# Patient Record
Sex: FEMALE | Race: WHITE | Employment: FULL TIME | URBAN - METROPOLITAN AREA
[De-identification: names, ages, dates, MRNs, and addresses within clinical notes are randomized per-mention and may not be internally consistent; named-entity substitution may affect disease eponyms.]

---

## 2017-04-06 ENCOUNTER — OFFICE VISIT (OUTPATIENT)
Dept: INTERNAL MEDICINE CLINIC | Age: 32
End: 2017-04-06

## 2017-04-06 VITALS
TEMPERATURE: 97.8 F | DIASTOLIC BLOOD PRESSURE: 44 MMHG | HEART RATE: 84 BPM | WEIGHT: 159 LBS | BODY MASS INDEX: 23.55 KG/M2 | RESPIRATION RATE: 16 BRPM | OXYGEN SATURATION: 98 % | HEIGHT: 69 IN | SYSTOLIC BLOOD PRESSURE: 95 MMHG

## 2017-04-06 DIAGNOSIS — Z00.00 WELL ADULT ON ROUTINE HEALTH CHECK: Primary | ICD-10-CM

## 2017-04-06 NOTE — PATIENT INSTRUCTIONS

## 2017-04-06 NOTE — LETTER
4/7/2017 11:09 AM 
 
Ms. [facility] Per 
[address] [address] [facility] Per: 
 
Please find your most recent results below. Resulted Orders CBC WITH AUTOMATED DIFF Result Value Ref Range WBC 6.3 3.4 - 10.8 x10E3/uL  
 RBC 4.59 3.77 - 5.28 x10E6/uL HGB 13.6 11.1 - 15.9 g/dL HCT 41.9 34.0 - 46.6 % MCV 91 79 - 97 fL  
 MCH 29.6 26.6 - 33.0 pg  
 MCHC 32.5 31.5 - 35.7 g/dL  
 RDW 13.8 12.3 - 15.4 % PLATELET 782 176 - 639 x10E3/uL NEUTROPHILS 61 % Lymphocytes 32 % MONOCYTES 6 % EOSINOPHILS 1 % BASOPHILS 0 %  
 ABS. NEUTROPHILS 3.8 1.4 - 7.0 x10E3/uL Abs Lymphocytes 2.0 0.7 - 3.1 x10E3/uL  
 ABS. MONOCYTES 0.4 0.1 - 0.9 x10E3/uL  
 ABS. EOSINOPHILS 0.0 0.0 - 0.4 x10E3/uL  
 ABS. BASOPHILS 0.0 0.0 - 0.2 x10E3/uL IMMATURE GRANULOCYTES 0 %  
 ABS. IMM. GRANS. 0.0 0.0 - 0.1 x10E3/uL Narrative Performed at:  [address]  [id number] : Angel Diallo MD, Phone:  [phone] UA/M W/RFLX CULTURE, COMP Result Value Ref Range Specific Gravity 1.023 1.005 - 1.030  
 pH (UA) 6.0 5.0 - 7.5 Color Yellow Yellow Appearance Cloudy (A) Clear Leukocyte Esterase Negative Negative Protein Negative Negative/Trace Glucose Negative Negative Ketone Negative Negative Blood Negative Negative Bilirubin Negative Negative Urobilinogen 0.2 0.2 - 1.0 mg/dL Nitrites Negative Negative Microscopic Examination Comment Comment:  
   Microscopic follows if indicated. Microscopic exam See additional order Comment:  
   Microscopic was indicated and was performed. URINALYSIS REFLEX Comment Comment: This specimen will not reflex to a Urine Culture. Narrative Performed at:  [address]  [id number] : Angel Diallo MD, Phone:  [phone] LIPID PANEL Result Value Ref Range Cholesterol, total 215 (H) 100 - 199 mg/dL Triglyceride 83 0 - 149 mg/dL HDL Cholesterol 94 >39 mg/dL VLDL, calculated 17 5 - 40 mg/dL LDL, calculated 104 (H) 0 - 99 mg/dL Narrative Performed at:  [address]  [id number] : Desiree Bellamy MD, Phone:  [phone] METABOLIC PANEL, COMPREHENSIVE Result Value Ref Range Glucose 71 65 - 99 mg/dL BUN 15 6 - 20 mg/dL Creatinine 0.79 0.57 - 1.00 mg/dL GFR est non- >59 mL/min/1.73 GFR est  >59 mL/min/1.73  
 BUN/Creatinine ratio 19 9 - 23 Comment: **Please note reference interval change** Sodium 142 134 - 144 mmol/L Potassium 4.5 3.5 - 5.2 mmol/L Chloride 101 96 - 106 mmol/L  
 CO2 22 18 - 29 mmol/L Calcium 9.9 8.7 - 10.2 mg/dL Protein, total 7.6 6.0 - 8.5 g/dL Albumin 5.2 3.5 - 5.5 g/dL GLOBULIN, TOTAL 2.4 1.5 - 4.5 g/dL A-G Ratio 2.2 1.2 - 2.2 Comment: **Please note reference interval change** Bilirubin, total 0.2 0.0 - 1.2 mg/dL Alk. phosphatase 48 39 - 117 IU/L  
 AST (SGOT) 12 0 - 40 IU/L  
 ALT (SGPT) 13 0 - 32 IU/L Narrative Performed at:  [address]  [id number] : Desiree Bellamy MD, Phone:  [phone] TSH AND FREE T4 Result Value Ref Range TSH 2.280 0.450 - 4.500 uIU/mL T4, Free 1.06 0.82 - 1.77 ng/dL Narrative Performed at:  [address]  [id number] : Desiree Bellamy MD, Phone:  [phone] MICROSCOPIC EXAMINATION Result Value Ref Range WBC 0-5 0 - 5 /hpf  
 RBC 0-2 0 - 2 /hpf Epithelial cells 0-10 0 - 10 /hpf Casts None seen None seen /lpf Mucus Present Not Estab. Bacteria None seen None seen/Few Narrative Performed at:  [address]  [id number] : Marycarmen Perez MD, Phone:  [phone] CVD REPORT Result Value Ref Range INTERPRETATION Note Comment:  
   Supplement report is available. Narrative Performed at:  [address] [place] [address]  [id number] : Deidra Camargo PhD, Phone:  [phone] RECOMMENDATIONS: 
 
1. CBC, kidney, liver, thyroid, UA level is within normal range. 2. Cholesterol level slightly above normal range.  No need to start any medication. Continue watching your diet, eat healthy, less traylor and fatty food, more baked or grilled or broiled food. Exercise 150 minutes/week will be helpful as well. Will recheck level in one year. Please call me if you have any questions: [phone] Sincerely, 
 
 
Adelita Hanson MD

## 2017-04-06 NOTE — MR AVS SNAPSHOT
Visit Information Date & Time Provider Department Dept. Phone Encounter #  
 4/6/2017  8:30 AM Marck Massey MD [facility] [phone] Follow-up Instructions Return in about 1 week (around 4/13/2017) for PPD. Upcoming Health Maintenance Date Due  
 PAP AKA CERVICAL CYTOLOGY 3/10/2020 DTaP/Tdap/Td series (2 - Td) 4/6/2027 Allergies as of 4/6/2017  Review Complete On: 4/6/2017 By: Patti Salmon MD  
 No Known Allergies Current Immunizations  Never Reviewed No immunizations on file. Not reviewed this visit You Were Diagnosed With   
  
 Codes Comments Well adult on routine health check    -  Primary ICD-10-CM: Z00.00 ICD-9-CM: V70.0 Vitals BP Pulse Temp Resp Height(growth percentile) Weight(growth percentile) 95/44 (BP 1 Location: Left arm, BP Patient Position: Sitting) 84 97.8 °F (36.6 °C) (Oral) 16 5' 9\" (1.753 m) 159 lb (72.1 kg) LMP SpO2 BMI OB Status Smoking Status 03/18/2017 98% 23.48 kg/m2 Having regular periods Never Smoker Vitals History BMI and BSA Data Body Mass Index Body Surface Area  
 23.48 kg/m 2 1.87 m 2 Preferred Pharmacy Pharmacy Name Phone [id number] [phone] Your Updated Medication List  
  
Notice  As of 4/6/2017  8:44 AM  
 You have not been prescribed any medications. We Performed the Following CBC WITH AUTOMATED DIFF [91660 CPT(R)] LIPID PANEL [84226 CPT(R)] METABOLIC PANEL, COMPREHENSIVE [60453 CPT(R)] TSH AND FREE T4 [85823 CPT(R)] UA/M W/RFLX CULTURE, COMP [35488 CPT(R)] Follow-up Instructions Return in about 1 week (around 4/13/2017) for PPD. Patient Instructions Well Visit, Ages 25 to 48: Care Instructions Your Care Instructions Physical exams can help you stay healthy.  Your doctor has checked your overall health and may have suggested ways to take good care of yourself. He or she also may have recommended tests. At home, you can help prevent illness with healthy eating, regular exercise, and other steps. Follow-up care is a key part of your treatment and safety. Be sure to make and go to all appointments, and call your doctor if you are having problems. It's also a good idea to know your test results and keep a list of the medicines you take. How can you care for yourself at home? · Reach and stay at a healthy weight. This will lower your risk for many problems, such as obesity, diabetes, heart disease, and high blood pressure. · Get at least 30 minutes of physical activity on most days of the week. Walking is a good choice. You also may want to do other activities, such as running, swimming, cycling, or playing tennis or team sports. Discuss any changes in your exercise program with your doctor. · Do not smoke or allow others to smoke around you. If you need help quitting, talk to your doctor about stop-smoking programs and medicines. These can increase your chances of quitting for good. · Talk to your doctor about whether you have any risk factors for sexually transmitted infections (STIs). Having one sex partner (who does not have STIs and does not have sex with anyone else) is a good way to avoid these infections. · Use birth control if you do not want to have children at this time. Talk with your doctor about the choices available and what might be best for you. · Protect your skin from too much sun. When you're outdoors from 10 a.m. to 4 p.m., stay in the shade or cover up with clothing and a hat with a wide brim. Wear sunglasses that block UV rays. Even when it's cloudy, put broad-spectrum sunscreen (SPF 30 or higher) on any exposed skin. · See a dentist one or two times a year for checkups and to have your teeth cleaned. · Wear a seat belt in the car. · Drink alcohol in moderation, if at all. That means no more than 2 drinks a day for men and 1 drink a day for women. Follow your doctor's advice about when to have certain tests. These tests can spot problems early. For everyone · Cholesterol. Have the fat (cholesterol) in your blood tested after age 21. Your doctor will tell you how often to have this done based on your age, family history, or other things that can increase your risk for heart disease. · Blood pressure. Have your blood pressure checked during a routine doctor visit. Your doctor will tell you how often to check your blood pressure based on your age, your blood pressure results, and other factors. · Vision. Talk with your doctor about how often to have a glaucoma test. 
· Diabetes. Ask your doctor whether you should have tests for diabetes. · Colon cancer. Have a test for colon cancer at age 48. You may have one of several tests. If you are younger than 48, you may need a test earlier if you have any risk factors. Risk factors include whether you already had a precancerous polyp removed from your colon or whether your parent, brother, sister, or child has had colon cancer. For women · Breast exam and mammogram. Talk to your doctor about when you should have a clinical breast exam and a mammogram. Medical experts differ on whether and how often women under 50 should have these tests. Your doctor can help you decide what is right for you. · Pap test and pelvic exam. Begin Pap tests at age 24. A Pap test is the best way to find cervical cancer. The test often is part of a pelvic exam. Ask how often to have this test. 
· Tests for sexually transmitted infections (STIs). Ask whether you should have tests for STIs. You may be at risk if you have sex with more than one person, especially if your partners do not wear condoms. For men · Tests for sexually transmitted infections (STIs).  Ask whether you should have tests for STIs. You may be at risk if you have sex with more than one person, especially if you do not wear a condom. · Testicular cancer exam. Ask your doctor whether you should check your testicles regularly. · Prostate exam. Talk to your doctor about whether you should have a blood test (called a PSA test) for prostate cancer. Experts differ on whether and when men should have this test. Some experts suggest it if you are older than 39 and are -American or have a father or brother who got prostate cancer when he was younger than 72. When should you call for help? Watch closely for changes in your health, and be sure to contact your doctor if you have any problems or symptoms that concern you. Where can you learn more? Go to http://ann-kaylah.info/. Enter P072 in the search box to learn more about \"Well Visit, Ages 25 to 48: Care Instructions. \" Current as of: July 19, 2016 Content Version: 11.2 © 6153-0048 [employer]. Care instructions adapted under license by [employer] (which disclaims liability or warranty for this information). If you have questions about a medical condition or this instruction, always ask your healthcare professional. [address] [address] any warranty or liability for your use of this information. Introducing [facility]! [employer] introduces [employer]azari patient portal. Now you can access parts of your medical record, email your doctor's office, and request medication refills online. 1. In your internet browser, go to [email or web address] 2. Click on the First Time User? Click Here link in the Sign In box. You will see the New Member Sign Up page. 3. Enter your Bazari Access Code exactly as it appears below. You will not need to use this code after youve completed the sign-up process. If you do not sign up before the expiration date, you must request a new code. · [employer] Access Code: [facility] Expires: 7/5/2017  8:44 AM 
 
4. Enter the last four digits of your Social Security Number (xxxx) and Date of Birth (mm/dd/yyyy) as indicated and click Submit. You will be taken to the next sign-up page. 5. Create a [employer] ID. This will be your [employer] login ID and cannot be changed, so think of one that is secure and easy to remember. 6. Create a [employer] password. You can change your password at any time. 7. Enter your Password Reset Question and Answer. This can be used at a later time if you forget your password. 8. Enter your e-mail address. You will receive e-mail notification when new information is available in [address] 9. Click Sign Up. You can now view and download portions of your medical record. 10. Click the Download Summary menu link to download a portable copy of your medical information. If you have questions, please visit the Frequently Asked Questions section of the [employer] website. Remember, [employer] is NOT to be used for urgent needs. For medical emergencies, dial 911. Now available from your iPhone and Android! Please provide this summary of care documentation to your next provider. If you have any questions after today's visit, please call [phone].

## 2017-04-06 NOTE — PROGRESS NOTES
Subjective:   28 y.o. female is here as a new patient for Well Woman Check as well as a physical form fill up for her [facility] care application. She reports fairly healthy. Diagnosed with infertility few years ago. She says she is still struggling with that. Goes to therapy on a regular basis. Not on any medication. Her gyne and breast care is done elsewhere by her Ob-Gyne physician at [facility]. Last pap was done in March 2017. There is no problem list on file for this patient. No Known Allergies  History reviewed. No pertinent past medical history. Past Surgical History:   Procedure Laterality Date    HX SEPTOPLASTY      HX WISDOM TEETH EXTRACTION       Family History   Problem Relation Age of Onset    Bipolar Disorder Father     Diabetes Brother     No Known Problems Brother      Social History   Substance Use Topics    Smoking status: Never Smoker    Smokeless tobacco: Not on file    Alcohol use 1.2 oz/week     1 Glasses of wine, 1 Cans of beer per week      Comment: rarely             ROS: Feeling generally well. No TIA's or unusual headaches, no dysphagia. No prolonged cough. No dyspnea or chest pain on exertion. No abdominal pain, change in bowel habits, black or bloody stools. No urinary tract symptoms. No new or unusual musculoskeletal symptoms. Specific concerns today: need form fill up as well as PPD. Objective: The patient appears well, alert, oriented x 3, in no distress. Visit Vitals    BP 95/44 (BP 1 Location: Left arm, BP Patient Position: Sitting)    Pulse 84    Temp 97.8 °F (36.6 °C) (Oral)    Resp 16    Ht 5' 9\" (1.753 m)    Wt 159 lb (72.1 kg)    LMP 03/18/2017    SpO2 98%    BMI 23.48 kg/m2     ENT normal.  Neck supple. No adenopathy or thyromegaly. MYRNA. Lungs are clear, good air entry, no wheezes, rhonchi or rales. S1 and S2 normal, no murmurs, regular rate and rhythm. Abdomen soft without tenderness, guarding, mass or organomegaly.  Extremities show no edema, normal peripheral pulses. Neurological is normal, no focal findings. Breast and Pelvic exams are deferred. Assessment/Plan:   Well Woman  increase physical activity, follow low fat diet, continue present plan, routine labs ordered, call if any problems    ICD-10-CM ICD-9-CM    1. Well adult on routine health check Z00.00 V70.0 CBC WITH AUTOMATED DIFF      UA/M W/RFLX CULTURE, COMP      LIPID PANEL      METABOLIC PANEL, COMPREHENSIVE      TSH AND FREE T4     Eveline Astorga was seen today for establish care. Diagnoses and all orders for this visit:    Well adult on routine health check  -     CBC WITH AUTOMATED DIFF  -     UA/M W/RFLX CULTURE, COMP  -     LIPID PANEL  -     METABOLIC PANEL, COMPREHENSIVE  -     TSH AND FREE T4       As soon as I get the blood work result and PPD result we will call patient to get the the form  from office. Follow-up Disposition:  Return in about 1 week (around 4/13/2017) for PPD.   reviewed diet, exercise and weight control

## 2017-04-07 LAB
ALBUMIN SERPL-MCNC: 5.2 G/DL (ref 3.5–5.5)
ALBUMIN/GLOB SERPL: 2.2 {RATIO} (ref 1.2–2.2)
ALP SERPL-CCNC: 48 IU/L (ref 39–117)
ALT SERPL-CCNC: 13 IU/L (ref 0–32)
APPEARANCE UR: ABNORMAL
AST SERPL-CCNC: 12 IU/L (ref 0–40)
BACTERIA #/AREA URNS HPF: NORMAL /[HPF]
BASOPHILS # BLD AUTO: 0 X10E3/UL (ref 0–0.2)
BASOPHILS NFR BLD AUTO: 0 %
BILIRUB SERPL-MCNC: 0.2 MG/DL (ref 0–1.2)
BILIRUB UR QL STRIP: NEGATIVE
BUN SERPL-MCNC: 15 MG/DL (ref 6–20)
BUN/CREAT SERPL: 19 (ref 9–23)
CALCIUM SERPL-MCNC: 9.9 MG/DL (ref 8.7–10.2)
CASTS URNS QL MICRO: NORMAL /LPF
CHLORIDE SERPL-SCNC: 101 MMOL/L (ref 96–106)
CHOLEST SERPL-MCNC: 215 MG/DL (ref 100–199)
CO2 SERPL-SCNC: 22 MMOL/L (ref 18–29)
COLOR UR: YELLOW
CREAT SERPL-MCNC: 0.79 MG/DL (ref 0.57–1)
EOSINOPHIL # BLD AUTO: 0 X10E3/UL (ref 0–0.4)
EOSINOPHIL NFR BLD AUTO: 1 %
EPI CELLS #/AREA URNS HPF: NORMAL /HPF
ERYTHROCYTE [DISTWIDTH] IN BLOOD BY AUTOMATED COUNT: 13.8 % (ref 12.3–15.4)
GLOBULIN SER CALC-MCNC: 2.4 G/DL (ref 1.5–4.5)
GLUCOSE SERPL-MCNC: 71 MG/DL (ref 65–99)
GLUCOSE UR QL: NEGATIVE
HCT VFR BLD AUTO: 41.9 % (ref 34–46.6)
HDLC SERPL-MCNC: 94 MG/DL
HGB BLD-MCNC: 13.6 G/DL (ref 11.1–15.9)
HGB UR QL STRIP: NEGATIVE
IMM GRANULOCYTES # BLD: 0 X10E3/UL (ref 0–0.1)
IMM GRANULOCYTES NFR BLD: 0 %
INTERPRETATION, 910389: NORMAL
KETONES UR QL STRIP: NEGATIVE
LDLC SERPL CALC-MCNC: 104 MG/DL (ref 0–99)
LEUKOCYTE ESTERASE UR QL STRIP: NEGATIVE
LYMPHOCYTES # BLD AUTO: 2 X10E3/UL (ref 0.7–3.1)
LYMPHOCYTES NFR BLD AUTO: 32 %
MCH RBC QN AUTO: 29.6 PG (ref 26.6–33)
MCHC RBC AUTO-ENTMCNC: 32.5 G/DL (ref 31.5–35.7)
MCV RBC AUTO: 91 FL (ref 79–97)
MICRO URNS: ABNORMAL
MICRO URNS: ABNORMAL
MONOCYTES # BLD AUTO: 0.4 X10E3/UL (ref 0.1–0.9)
MONOCYTES NFR BLD AUTO: 6 %
MUCOUS THREADS URNS QL MICRO: PRESENT
NEUTROPHILS # BLD AUTO: 3.8 X10E3/UL (ref 1.4–7)
NEUTROPHILS NFR BLD AUTO: 61 %
NITRITE UR QL STRIP: NEGATIVE
PH UR STRIP: 6 [PH] (ref 5–7.5)
PLATELET # BLD AUTO: 296 X10E3/UL (ref 150–379)
POTASSIUM SERPL-SCNC: 4.5 MMOL/L (ref 3.5–5.2)
PROT SERPL-MCNC: 7.6 G/DL (ref 6–8.5)
PROT UR QL STRIP: NEGATIVE
RBC # BLD AUTO: 4.59 X10E6/UL (ref 3.77–5.28)
RBC #/AREA URNS HPF: NORMAL /HPF
SODIUM SERPL-SCNC: 142 MMOL/L (ref 134–144)
SP GR UR: 1.02 (ref 1–1.03)
T4 FREE SERPL-MCNC: 1.06 NG/DL (ref 0.82–1.77)
TRIGL SERPL-MCNC: 83 MG/DL (ref 0–149)
TSH SERPL DL<=0.005 MIU/L-ACNC: 2.28 UIU/ML (ref 0.45–4.5)
URINALYSIS REFLEX , 377201: ABNORMAL
UROBILINOGEN UR STRIP-MCNC: 0.2 MG/DL (ref 0.2–1)
VLDLC SERPL CALC-MCNC: 17 MG/DL (ref 5–40)
WBC # BLD AUTO: 6.3 X10E3/UL (ref 3.4–10.8)
WBC #/AREA URNS HPF: NORMAL /HPF

## 2017-04-07 NOTE — PROGRESS NOTES
Please mail letter:     1. CBC, kidney, liver, thyroid, UA level is within normal range. 2. Cholesterol level slightly above normal range. No need to start any medication. Continue watching your diet, eat healthy, less traylor and fatty food, more baked or grilled or broiled food. Exercise 150 minutes/week will be helpful as well. Will recheck level in one year.

## 2017-04-10 ENCOUNTER — LAB ONLY (OUTPATIENT)
Dept: INTERNAL MEDICINE CLINIC | Age: 32
End: 2017-04-10

## 2017-04-10 DIAGNOSIS — Z11.1 TUBERCULOSIS SCREENING: Primary | ICD-10-CM

## 2017-04-10 NOTE — PROGRESS NOTES
PPD Placement note  Cailin Kempoche, 28 y.o. female is here today for placement of PPD test  PPD placed on 4/10/2017 on left forearm. Patient advised to return for reading within 48-72 hours.

## 2017-04-13 LAB
MM INDURATION POC: 0 MM (ref 0–5)
PPD POC: NEGATIVE NEGATIVE

## 2017-09-25 ENCOUNTER — TELEPHONE (OUTPATIENT)
Dept: INTERNAL MEDICINE CLINIC | Age: 32
End: 2017-09-25

## 2017-09-25 NOTE — TELEPHONE ENCOUNTER
Pt states that the medication will be for anxiety/depression. She has not taken anything in the past. Advised that Kit Hopkins will follow with psychologist to prescribe prescription. If psychiatrist is needed in the future she will be referred to one. Pt verbalized her understanding.

## 2017-09-25 NOTE — TELEPHONE ENCOUNTER
Is she asking for Rx for anxiety/depression? What exactly she is asking for? Is she on any medication?

## 2017-09-25 NOTE — TELEPHONE ENCOUNTER
Patient is seeing a psychologist with [facility] with [facility] and requesting that Dr. Sriram Brambila write prescriptions for her in line with her seeing this psychologist.  Patient was asking about that and needed to know in order to see if she could move forward or needed to find another pcp.

## 2017-10-12 ENCOUNTER — OFFICE VISIT (OUTPATIENT)
Dept: INTERNAL MEDICINE CLINIC | Age: 32
End: 2017-10-12

## 2017-10-12 VITALS
HEART RATE: 84 BPM | TEMPERATURE: 97.3 F | BODY MASS INDEX: 23.55 KG/M2 | SYSTOLIC BLOOD PRESSURE: 90 MMHG | DIASTOLIC BLOOD PRESSURE: 65 MMHG | WEIGHT: 159 LBS | HEIGHT: 69 IN | RESPIRATION RATE: 17 BRPM | OXYGEN SATURATION: 100 %

## 2017-10-12 DIAGNOSIS — F41.8 ANXIETY ASSOCIATED WITH DEPRESSION: Primary | ICD-10-CM

## 2017-10-12 RX ORDER — VENLAFAXINE HYDROCHLORIDE 37.5 MG/1
37.5 CAPSULE, EXTENDED RELEASE ORAL DAILY
Qty: 30 CAP | Refills: 1 | Status: SHIPPED | OUTPATIENT
Start: 2017-10-12 | End: 2017-11-27 | Stop reason: SDUPTHER

## 2017-10-12 NOTE — PROGRESS NOTES
Subjective:     Chief Complaint   Patient presents with    Request For New Medication    Depression        She  is a 33y.o. year old female who presents today to discuss treatment option for her anxiety and depression. Reports that she has been seeing a clinical psychologist at [facility] psychologists for her ongoing depression and anxiety. She reports that she was diagnosed with infertility recently which has been a gig factor for her depression and anxiety. She has a 2 y/o daughter, she is always worry that something bad will happen. Reports that she never get enough sleep. Difficult to go back to sleep or staying asleep. PHQ score 12  KORTNEY score is 10   Pertinent items are noted in HPI.   Objective:     Vitals:    10/12/17 0747   BP: 90/65   Pulse: 84   Resp: 17   Temp: 97.3 °F (36.3 °C)   TempSrc: Oral   SpO2: 100%   Weight: 159 lb (72.1 kg)   Height: 5' 9\" (1.753 m)       Physical Examination: General appearance - alert, well appearing, and in no distress, oriented to person, place, and time and normal appearing weight  Mental status - alert, oriented to person, place, and time, normal mood, behavior, speech, dress, motor activity, and thought processes  Chest - clear to auscultation, no wheezes, rales or rhonchi, symmetric air entry  Heart - normal rate, regular rhythm, normal S1, S2, no murmurs, rubs, clicks or gallops    No Known Allergies   Social History     Social History    Marital status:      Spouse name: N/A    Number of children: N/A    Years of education: N/A     Social History Main Topics    Smoking status: Never Smoker    Smokeless tobacco: Never Used    Alcohol use 1.2 oz/week     1 Glasses of wine, 1 Cans of beer per week      Comment: rarely    Drug use: No    Sexual activity: Yes     Partners: Male     Birth control/ protection: None     Other Topics Concern    None     Social History Narrative      Family History   Problem Relation Age of Onset    Bipolar Disorder Father     Diabetes Brother     No Known Problems Brother       Past Surgical History:   Procedure Laterality Date    HX SEPTOPLASTY      HX WISDOM TEETH EXTRACTION        History reviewed. No pertinent past medical history. Assessment/ Plan:   Diagnoses and all orders for this visit:    1. Anxiety associated with depression  -     After discussing options we agreed on starting venlafaxine-SR (EFFEXOR-XR) 37.5 mg capsule; Take 1 Cap by mouth daily. Indications: ANXIETY WITH DEPRESSION  -     Continue therapy. -     Discussed med side effect.   -     Follow up in 6 weeks. Medication risks/benefits/costs/interactions/alternatives discussed with patient. Advised patient to call back or return to office if symptoms worsen/change/persist. If patient cannot reach us or should anything more severe/urgent arise he/she should proceed directly to the nearest emergency department. Discussed expected course/resolution/complications of diagnosis in detail with patient. Patient given a written after visit summary which includes her diagnoses, current medications and vitals. Patient expressed understanding with the diagnosis and plan.        Follow-up Disposition: Not on File

## 2017-10-12 NOTE — MR AVS SNAPSHOT
Visit Information Date & Time Provider Department Dept. Phone Encounter #  
 10/12/2017  7:30 AM Marck Moreno MD [facility] Internal Medicine [phone] [id number] Follow-up Instructions Return in about 6 weeks (around 11/23/2017) for anxiety and depression. Katherene Means Upcoming Health Maintenance Date Due  
 PAP AKA CERVICAL CYTOLOGY 3/10/2020 DTaP/Tdap/Td series (2 - Td) 4/6/2027 Allergies as of 10/12/2017  Review Complete On: 10/12/2017 By: Marilu Roberts MD  
 No Known Allergies Current Immunizations  Never Reviewed Name Date  
 TB Skin Test (PPD) Intradermal 4/10/2017 Not reviewed this visit You Were Diagnosed With   
  
 Codes Comments Anxiety associated with depression    -  Primary ICD-10-CM: F41.8 ICD-9-CM: 300.4 Vitals BP Pulse Temp Resp Height(growth percentile) Weight(growth percentile) 90/65 (BP 1 Location: Left arm, BP Patient Position: Sitting) 84 97.3 °F (36.3 °C) (Oral) 17 5' 9\" (1.753 m) 159 lb (72.1 kg) LMP SpO2 BMI OB Status Smoking Status 09/12/2017 100% 23.48 kg/m2 Having regular periods Never Smoker Vitals History BMI and BSA Data Body Mass Index Body Surface Area  
 23.48 kg/m 2 1.87 m 2 Preferred Pharmacy Pharmacy Name Phone [id number] [place] [place] [phone] Your Updated Medication List  
  
   
This list is accurate as of: 10/12/17  8:05 AM.  Always use your most recent med list.  
  
  
  
  
 venlafaxine-SR 37.5 mg capsule Commonly known as:  EFFEXOR-XR Take 1 Cap by mouth daily. Indications: ANXIETY WITH DEPRESSION Prescriptions Sent to Pharmacy Refills  
 venlafaxine-SR (EFFEXOR-XR) 37.5 mg capsule 1 Sig: Take 1 Cap by mouth daily. Indications: ANXIETY WITH DEPRESSION  Class: Normal  
 Pharmacy: [facility], [address] AT [address] [place] [address] #: [phone] Route: Oral  
  
Follow-up Instructions Return in about 6 weeks (around 11/23/2017) for anxiety and depression. .  
  
  
Patient Instructions Anxiety Disorder: Care Instructions Your Care Instructions Anxiety is a normal reaction to stress. Difficult situations can cause you to have symptoms such as sweaty palms and a nervous feeling. In an anxiety disorder, the symptoms are far more severe. Constant worry, muscle tension, trouble sleeping, nausea and diarrhea, and other symptoms can make normal daily activities difficult or impossible. These symptoms may occur for no reason, and they can affect your work, school, or social life. Medicines, counseling, and self-care can all help. Follow-up care is a key part of your treatment and safety. Be sure to make and go to all appointments, and call your doctor if you are having problems. It's also a good idea to know your test results and keep a list of the medicines you take. How can you care for yourself at home? · Take medicines exactly as directed. Call your doctor if you think you are having a problem with your medicine. · Go to your counseling sessions and follow-up appointments. · Recognize and accept your anxiety. Then, when you are in a situation that makes you anxious, say to yourself, \"This is not an emergency. I feel uncomfortable, but I am not in danger. I can keep going even if I feel anxious. \" · Be kind to your body: ¨ Relieve tension with exercise or a massage. ¨ Get enough rest. 
¨ Avoid alcohol, caffeine, nicotine, and illegal drugs. They can increase your anxiety level and cause sleep problems. ¨ Learn and do relaxation techniques. See below for more about these techniques. · Engage your mind. Get out and do something you enjoy. Go to a funny movie, or take a walk or hike. Plan your day. Having too much or too little to do can make you anxious. · Keep a record of your symptoms. Discuss your fears with a good friend or family member, or join a support group for people with similar problems. Talking to others sometimes relieves stress. · Get involved in social groups, or volunteer to help others. Being alone sometimes makes things seem worse than they are. · Get at least 30 minutes of exercise on most days of the week to relieve stress. Walking is a good choice. You also may want to do other activities, such as running, swimming, cycling, or playing tennis or team sports. Relaxation techniques Do relaxation exercises 10 to 20 minutes a day. You can play soothing, relaxing music while you do them, if you wish. · Tell others in your house that you are going to do your relaxation exercises. Ask them not to disturb you. · Find a comfortable place, away from all distractions and noise. · Lie down on your back, or sit with your back straight. · Focus on your breathing. Make it slow and steady. · Breathe in through your nose. Breathe out through either your nose or mouth. · Breathe deeply, filling up the area between your navel and your rib cage. Breathe so that your belly goes up and down. · Do not hold your breath. · Breathe like this for 5 to 10 minutes. Notice the feeling of calmness throughout your whole body. As you continue to breathe slowly and deeply, relax by doing the following for another 5 to 10 minutes: · Tighten and relax each muscle group in your body. You can begin at your toes and work your way up to your head. · Imagine your muscle groups relaxing and becoming heavy. · Empty your mind of all thoughts. · Let yourself relax more and more deeply. · Become aware of the state of calmness that surrounds you. · When your relaxation time is over, you can bring yourself back to alertness by moving your fingers and toes and then your hands and feet and then stretching and moving your entire body.  Sometimes people fall asleep during relaxation, but they usually wake up shortly afterward. · Always give yourself time to return to full alertness before you drive a car or do anything that might cause an accident if you are not fully alert. Never play a relaxation tape while you drive a car. When should you call for help? Call 911 anytime you think you may need emergency care. For example, call if: 
· You feel you cannot stop from hurting yourself or someone else. Keep the numbers for these national suicide hotlines: 2-573-962-TALK (5-357.375.5289) and 9-460-WFUPVEY (2-778.626.3780). If you or someone you know talks about suicide or feeling hopeless, get help right away. Watch closely for changes in your health, and be sure to contact your doctor if: 
· You have anxiety or fear that affects your life. · You have symptoms of anxiety that are new or different from those you had before. Where can you learn more? Go to http://ann-kaylah.info/. Enter P754 in the search box to learn more about \"Anxiety Disorder: Care Instructions. \" Current as of: July 26, 2016 Content Version: 11.3 © 9424-1342 [employer]. Care instructions adapted under license by End[employer] (which disclaims liability or warranty for this information). If you have questions about a medical condition or this instruction, always ask your healthcare professional. [place] [address] any warranty or liability for your use of this information. Introducing [facility]! Holly Peng introduces Informative patient portal. Now you can access parts of your medical record, email your doctor's office, and request medication refills online. 1. In your internet browser, go to [email or web address] [email or web address]/[email or web address] 2. Click on the First Time User? Click Here link in the Sign In box. You will see the New Member Sign Up page. 3. Enter your Informative Access Code exactly as it appears below.  You will not need to use this code after youve completed the sign-up process. If you do not sign up before the expiration date, you must request a new code. · [employer] Access Code: [email or web address] Expires: 1/10/2018  7:47 AM 
 
4. Enter the last four digits of your Social Security Number (xxxx) and Date of Birth (mm/dd/yyyy) as indicated and click Submit. You will be taken to the next sign-up page. 5. Create a [employer] ID. This will be your [employer] login ID and cannot be changed, so think of one that is secure and easy to remember. 6. Create a [employer] password. You can change your password at any time. 7. Enter your Password Reset Question and Answer. This can be used at a later time if you forget your password. 8. Enter your e-mail address. You will receive e-mail notification when new information is available in [address]. 9. Click Sign Up. You can now view and download portions of your medical record. 10. Click the Download Summary menu link to download a portable copy of your medical information. If you have questions, please visit the Frequently Asked Questions section of the [employer] website. Remember, [employer] is NOT to be used for urgent needs. For medical emergencies, dial 911. Now available from your iPhone and Android! Please provide this summary of care documentation to your next provider. If you have any questions after today's visit, please call [phone].

## 2017-10-12 NOTE — PATIENT INSTRUCTIONS

## 2017-11-27 ENCOUNTER — OFFICE VISIT (OUTPATIENT)
Dept: INTERNAL MEDICINE CLINIC | Age: 32
End: 2017-11-27

## 2017-11-27 VITALS
TEMPERATURE: 97.9 F | BODY MASS INDEX: 23.7 KG/M2 | HEIGHT: 69 IN | DIASTOLIC BLOOD PRESSURE: 56 MMHG | HEART RATE: 88 BPM | RESPIRATION RATE: 18 BRPM | SYSTOLIC BLOOD PRESSURE: 89 MMHG | WEIGHT: 160 LBS

## 2017-11-27 DIAGNOSIS — F41.8 ANXIETY ASSOCIATED WITH DEPRESSION: ICD-10-CM

## 2017-11-27 RX ORDER — VENLAFAXINE HYDROCHLORIDE 37.5 MG/1
37.5 CAPSULE, EXTENDED RELEASE ORAL DAILY
Qty: 30 CAP | Refills: 6 | Status: SHIPPED | OUTPATIENT
Start: 2017-11-27 | End: 2018-02-06 | Stop reason: SDUPTHER

## 2017-11-27 NOTE — PROGRESS NOTES
Subjective:     Chief Complaint   Patient presents with    Anxiety     6 wk f/u    Depression        She  is a 33y.o. year old female who presents today for 6 weeks follow up on anxiety and depression. She was started on Effexor 37.5 mg 6 weeks ago and continued on psychotherapy. She reports that she has seen big improvement with her mood. Her  also seen some positive change. Over all she thinks the medication is working. Denies any chest pain, soa. Pertinent items are noted in HPI. Objective:     Vitals:    11/27/17 0830   BP: (!) 89/56   Pulse: 88   Resp: 18   Temp: 97.9 °F (36.6 °C)   TempSrc: Oral   Weight: 160 lb (72.6 kg)   Height: 5' 9\" (1.753 m)       Physical Examination: General appearance - alert, well appearing, and in no distress, oriented to person, place, and time and normal appearing weight  Mental status - alert, oriented to person, place, and time, normal mood, behavior, speech, dress, motor activity, and thought processes  Chest - clear to auscultation, no wheezes, rales or rhonchi, symmetric air entry  Heart - normal rate, regular rhythm, normal S1, S2, no murmurs, rubs, clicks or gallops    No Known Allergies   Social History     Social History    Marital status:      Spouse name: N/A    Number of children: N/A    Years of education: N/A     Social History Main Topics    Smoking status: Never Smoker    Smokeless tobacco: Never Used    Alcohol use 1.2 oz/week     1 Glasses of wine, 1 Cans of beer per week      Comment: rarely    Drug use: No    Sexual activity: Yes     Partners: Male     Birth control/ protection: None     Other Topics Concern    None     Social History Narrative      Family History   Problem Relation Age of Onset    Bipolar Disorder Father     Diabetes Brother     No Known Problems Brother       Past Surgical History:   Procedure Laterality Date    HX SEPTOPLASTY      HX WISDOM TEETH EXTRACTION        History reviewed.  No pertinent past medical history. Current Outpatient Prescriptions   Medication Sig Dispense Refill    venlafaxine-SR (EFFEXOR-XR) 37.5 mg capsule Take 1 Cap by mouth daily. Indications: ANXIETY WITH DEPRESSION 30 Cap 6        Assessment/ Plan:   Diagnoses and all orders for this visit:    1. Anxiety associated with depression  -   Stable . Continue   venlafaxine-SR (EFFEXOR-XR) 37.5 mg capsule; Take 1 Cap by mouth daily. Indications: ANXIETY WITH DEPRESSION           Medication risks/benefits/costs/interactions/alternatives discussed with patient. Advised patient to call back or return to office if symptoms worsen/change/persist. If patient cannot reach us or should anything more severe/urgent arise he/she should proceed directly to the nearest emergency department. Discussed expected course/resolution/complications of diagnosis in detail with patient. Patient given a written after visit summary which includes her diagnoses, current medications and vitals. Patient expressed understanding with the diagnosis and plan. Follow-up Disposition:  Return in about 6 months (around 5/27/2018) for anxiety.

## 2017-11-27 NOTE — MR AVS SNAPSHOT
Visit Information Date & Time Provider Department Dept. Phone Encounter #  
 11/27/2017  8:15 AM Marck Cotton MD [facility] Internal Medicine [phone] [id number] Follow-up Instructions Return in about 6 months (around 5/27/2018) for anxiety. Upcoming Health Maintenance Date Due  
 PAP AKA CERVICAL CYTOLOGY 3/10/2020 DTaP/Tdap/Td series (2 - Td) 4/6/2027 Allergies as of 11/27/2017  Review Complete On: 11/27/2017 By: Ana Milton MD  
 No Known Allergies Current Immunizations  Reviewed on 11/27/2017 Name Date Influenza Vaccine 10/7/2017 TB Skin Test (PPD) Intradermal 4/10/2017 Reviewed by Ana Milton MD on 11/27/2017 at  8:37 AM  
 Reviewed by Ana Milton MD on 11/27/2017 at  8:38 AM  
You Were Diagnosed With   
  
 Codes Comments Anxiety associated with depression     ICD-10-CM: F41.8 ICD-9-CM: 300.4 Vitals BP Pulse Temp Resp Height(growth percentile) Weight(growth percentile) (!) 89/56 (BP 1 Location: Left arm, BP Patient Position: Sitting) 88 97.9 °F (36.6 °C) (Oral) 18 5' 9\" (1.753 m) 160 lb (72.6 kg) LMP BMI OB Status Smoking Status 11/13/2017 23.63 kg/m2 Having regular periods Never Smoker Vitals History BMI and BSA Data Body Mass Index Body Surface Area  
 23.63 kg/m 2 1.88 m 2 Preferred Pharmacy Pharmacy Name Phone [id number] [phone] Your Updated Medication List  
  
   
This list is accurate as of: 11/27/17  8:39 AM.  Always use your most recent med list.  
  
  
  
  
 venlafaxine-SR 37.5 mg capsule Commonly known as:  EFFEXOR-XR Take 1 Cap by mouth daily. Indications: ANXIETY WITH DEPRESSION Prescriptions Sent to Pharmacy Refills  
 venlafaxine-SR (EFFEXOR-XR) 37.5 mg capsule 6 Sig: Take 1 Cap by mouth daily. Indications: ANXIETY WITH DEPRESSION  Class: Normal  
 Pharmacy: [address], [place]  #: [phone] Route: Oral  
  
Follow-up Instructions Return in about 6 months (around 5/27/2018) for anxiety. Introducing [facility] SERVICES! [employer] introduces Nubisio patient portal. Now you can access parts of your medical record, email your doctor's office, and request medication refills online. 1. In your internet browser, go to [email or web address]¨â€¹Â±Ã¨â€¦Â¾Ã§Â½â€˜/[email or web address] 2. Click on the First Time User? Click Here link in the Sign In box. You will see the New Member Sign Up page. 3. Enter your Nub[employer] Access Code exactly as it appears below. You will not need to use this code after youve completed the sign-up process. If you do not sign up before the expiration date, you must request a new code. · Nub[employer]io Access Code: [email or web address] Expires: 1/10/2018  6:47 AM 
 
4. Enter the last four digits of your Social Security Number (xxxx) and Date of Birth (mm/dd/yyyy) as indicated and click Submit. You will be taken to the next sign-up page. 5. Create a Nubisio ID. This will be your Nubisio login ID and cannot be changed, so think of one that is secure and easy to remember. 6. Create a Nubisio password. You can change your password at any time. 7. Enter your Password Reset Question and Answer. This can be used at a later time if you forget your password. 8. Enter your e-mail address. You will receive e-mail notification when new information is available in [address] 9. Click Sign Up. You can now view and download portions of your medical record. 10. Click the Download Summary menu link to download a portable copy of your medical information. If you have questions, please visit the Frequently Asked Questions section of the Nubisio website. Remember, Nubisio is NOT to be used for urgent needs. For medical emergencies, dial 911. Now available from your iPhone and Android! Please provide this summary of care documentation to your next provider. Your primary care clinician is listed as Marck Armstrong. If you have any questions after today's visit, please call [phone].

## 2018-02-06 DIAGNOSIS — F41.8 ANXIETY ASSOCIATED WITH DEPRESSION: ICD-10-CM

## 2018-02-06 RX ORDER — VENLAFAXINE HYDROCHLORIDE 37.5 MG/1
37.5 CAPSULE, EXTENDED RELEASE ORAL DAILY
Qty: 30 CAP | Refills: 6 | Status: SHIPPED | OUTPATIENT
Start: 2018-02-06 | End: 2018-02-08 | Stop reason: SDUPTHER

## 2018-02-08 ENCOUNTER — TELEPHONE (OUTPATIENT)
Dept: INTERNAL MEDICINE CLINIC | Age: 33
End: 2018-02-08

## 2018-02-08 DIAGNOSIS — F41.8 ANXIETY ASSOCIATED WITH DEPRESSION: ICD-10-CM

## 2018-02-08 RX ORDER — VENLAFAXINE HYDROCHLORIDE 37.5 MG/1
37.5 CAPSULE, EXTENDED RELEASE ORAL DAILY
Qty: 90 CAP | Refills: 0 | Status: SHIPPED | OUTPATIENT
Start: 2018-02-08 | End: 2018-06-04 | Stop reason: SDUPTHER

## 2018-02-14 ENCOUNTER — OFFICE VISIT (OUTPATIENT)
Dept: INTERNAL MEDICINE CLINIC | Age: 33
End: 2018-02-14

## 2018-02-14 VITALS
DIASTOLIC BLOOD PRESSURE: 62 MMHG | OXYGEN SATURATION: 100 % | BODY MASS INDEX: 23.22 KG/M2 | RESPIRATION RATE: 18 BRPM | SYSTOLIC BLOOD PRESSURE: 100 MMHG | TEMPERATURE: 98.2 F | HEART RATE: 87 BPM | HEIGHT: 69 IN | WEIGHT: 156.8 LBS

## 2018-02-14 DIAGNOSIS — R05.9 COUGH: ICD-10-CM

## 2018-02-14 DIAGNOSIS — J02.0 STREP PHARYNGITIS: Primary | ICD-10-CM

## 2018-02-14 DIAGNOSIS — J02.9 SORE THROAT: ICD-10-CM

## 2018-02-14 LAB
QUICKVUE INFLUENZA TEST: NEGATIVE
S PYO AG THROAT QL: POSITIVE
VALID INTERNAL CONTROL?: YES
VALID INTERNAL CONTROL?: YES

## 2018-02-14 RX ORDER — BENZONATATE 200 MG/1
200 CAPSULE ORAL
Qty: 21 CAP | Refills: 0 | Status: SHIPPED | OUTPATIENT
Start: 2018-02-14 | End: 2018-02-21

## 2018-02-14 RX ORDER — AMOXICILLIN 875 MG/1
875 TABLET, FILM COATED ORAL 2 TIMES DAILY
Qty: 20 TAB | Refills: 0 | Status: SHIPPED | OUTPATIENT
Start: 2018-02-14 | End: 2018-02-24

## 2018-02-14 NOTE — MR AVS SNAPSHOT
[facility]. [address] 
[phone] Patient: Jose Villalta MRN: [id number] KGB:[id number][birth year]10 Visit Information Date & Time Provider Department Dept. Phone Encounter #  
 2/14/2018 11:45 AM Marck Cohen MD [facility] Internal Medicine [phone] [id number] Follow-up Instructions Return if symptoms worsen or fail to improve. Upcoming Health Maintenance Date Due  
 PAP AKA CERVICAL CYTOLOGY 3/10/2020 DTaP/Tdap/Td series (2 - Td) 4/6/2027 Allergies as of 2/14/2018  Review Complete On: 2/14/2018 By: Palmira Torres LPN No Known Allergies Current Immunizations  Reviewed on 11/27/2017 Name Date Influenza Vaccine 10/7/2017 TB Skin Test (PPD) Intradermal 4/10/2017 Not reviewed this visit You Were Diagnosed With   
  
 Codes Comments Sore throat    -  Primary ICD-10-CM: J02.9 ICD-9-CM: 723 Strep pharyngitis     ICD-10-CM: J02.0 ICD-9-CM: 034.0 Vitals BP Pulse Temp Resp Height(growth percentile) Weight(growth percentile) 100/62 (BP 1 Location: Left arm, BP Patient Position: Sitting) 87 98.2 °F (36.8 °C) (Temporal) 18 5' 9\" (1.753 m) 156 lb 12.8 oz (71.1 kg) LMP SpO2 BMI OB Status Smoking Status 01/30/2018 100% 23.16 kg/m2 Having regular periods Never Smoker Vitals History BMI and BSA Data Body Mass Index Body Surface Area  
 23.16 kg/m 2 1.86 m 2 Preferred Pharmacy Pharmacy Name Phone [id number] [place] [place] [phone] Your Updated Medication List  
  
   
This list is accurate as of: 2/14/18 12:22 PM.  Always use your most recent med list.  
  
  
  
  
 amoxicillin 875 mg tablet Commonly known as:  AMOXIL Take 1 Tab by mouth two (2) times a day for 10 days. benzonatate 200 mg capsule Commonly known as:  TESSALON  
 Take 1 Cap by mouth three (3) times daily as needed for Cough for up to 7 days. venlafaxine-SR 37.5 mg capsule Commonly known as:  EFFEXOR-XR Take 1 Cap by mouth daily. Indications: ANXIETY WITH DEPRESSION Prescriptions Sent to Pharmacy Refills  
 amoxicillin (AMOXIL) 875 mg tablet 0 Sig: Take 1 Tab by mouth two (2) times a day for 10 days. Class: Normal  
 Pharmacy: [address] [place] [address] #: [phone] Route: Oral  
 benzonatate (TESSALON) 200 mg capsule 0 Sig: Take 1 Cap by mouth three (3) times daily as needed for Cough for up to 7 days. Class: Normal  
 Pharmacy: [address] [place] [address] #: [phone] Route: Oral  
  
We Performed the Following AMB POC RAPID INFLUENZA TEST [52267 CPT(R)] AMB POC RAPID STREP A [54743 CPT(R)] Follow-up Instructions Return if symptoms worsen or fail to improve. Introducing [address]! [facility] introduces [employer] patient portal. Now you can access parts of your medical record, email your doctor's office, and request medication refills online. 1. In your internet browser, go to [email or web address] 2. Click on the First Time User? Click Here link in the Sign In box. You will see the New Member Sign Up page. 3. Enter your [employer] Access Code exactly as it appears below. You will not need to use this code after youve completed the sign-up process. If you do not sign up before the expiration date, you must request a new code. · [employer] Access Code: [email or web address] Expires: 5/15/2018 11:59 AM 
 
4. Enter the last four digits of your Social Security Number (xxxx) and Date of Birth (mm/dd/yyyy) as indicated and click Submit. You will be taken to the next sign-up page. 5. Create a [employer] ID.  This will be your [employer] login ID and cannot be changed, so think of one that is secure and easy to remember. 6. Create a Careers360 password. You can change your password at any time. 7. Enter your Password Reset Question and Answer. This can be used at a later time if you forget your password. 8. Enter your e-mail address. You will receive e-mail notification when new information is available in [address] 9. Click Sign Up. You can now view and download portions of your medical record. 10. Click the Download Summary menu link to download a portable copy of your medical information. If you have questions, please visit the Frequently Asked Questions section of the [employer] website. Remember, [employer] is NOT to be used for urgent needs. For medical emergencies, dial 911. Now available from your iPhone and Android! Please provide this summary of care documentation to your next provider. Your primary care clinician is listed as Marck Armstrong. If you have any questions after today's visit, please call [phone].

## 2018-02-14 NOTE — LETTER
NOTIFICATION OF RETURN TO WORK / SCHOOL 
 
2/14/2018 12:21 PM 
 
[facility] 
[address] [address] Renan Tang To Whom It May Concern: 
 
Oskar Fisher was under the care of Nita She will be able to return to work/school on 2/16/2018with no restrictions. If there are questions or concerns please have the patient contact our office.  
 
Sincerely, 
 
 
Neil Knox MD

## 2018-02-15 NOTE — PROGRESS NOTES
Subjective:     Chief Complaint   Patient presents with    Headache     x 3 days. Only took two days worth of tamiflu    Sore Throat        She  is a 33y.o. year old female who presents with a complain of sore throat, headache, swollen gland and dry cough  For the past three weeks. Reports that she was diagnosed with Flu three weeks ago but did not complete the course. Reports that for the past one week her left side of the neck is very sore . Very painful to swallow. At night she has been having dry coughing spells. Denies any fever, soa, wheezing. Pertinent items are noted in HPI. Objective:     Vitals:    02/14/18 1157   BP: 100/62   Pulse: 87   Resp: 18   Temp: 98.2 °F (36.8 °C)   TempSrc: Temporal   SpO2: 100%   Weight: 156 lb 12.8 oz (71.1 kg)   Height: 5' 9\" (1.753 m)       Physical Examination: General appearance - alert, well appearing, and in no distress, oriented to person, place, and time and normal appearing weight  Mental status - alert, oriented to person, place, and time, normal mood, behavior, speech, dress, motor activity, and thought processes  Ears - bilateral TM's and external ear canals normal  Mouth - tonsils hypertrophied with exudate  Neck - there is a single mobile, tender, soft 2 cm lymph node in her left anterior cervical chain.   Chest - clear to auscultation, no wheezes, rales or rhonchi, symmetric air entry  Heart - normal rate, regular rhythm, normal S1, S2, no murmurs, rubs, clicks or gallops    No Known Allergies   Social History     Social History    Marital status:      Spouse name: N/A    Number of children: N/A    Years of education: N/A     Social History Main Topics    Smoking status: Never Smoker    Smokeless tobacco: Never Used    Alcohol use 1.2 oz/week     1 Glasses of wine, 1 Cans of beer per week      Comment: rarely    Drug use: No    Sexual activity: Yes     Partners: Male     Birth control/ protection: None     Other Topics Concern    None Social History Narrative      Family History   Problem Relation Age of Onset    Bipolar Disorder Father     Diabetes Brother     No Known Problems Brother       Past Surgical History:   Procedure Laterality Date    HX SEPTOPLASTY      HX WISDOM TEETH EXTRACTION        History reviewed. No pertinent past medical history. Current Outpatient Prescriptions   Medication Sig Dispense Refill    amoxicillin (AMOXIL) 875 mg tablet Take 1 Tab by mouth two (2) times a day for 10 days. 20 Tab 0    benzonatate (TESSALON) 200 mg capsule Take 1 Cap by mouth three (3) times daily as needed for Cough for up to 7 days. 21 Cap 0    venlafaxine-SR (EFFEXOR-XR) 37.5 mg capsule Take 1 Cap by mouth daily. Indications: ANXIETY WITH DEPRESSION 90 Cap 0        Assessment/ Plan:   Diagnoses and all orders for this visit:    1. Strep pharyngitis  -     AMB POC RAPID STREP A is positive  -     AMB POC RAPID INFLUENZA TEST is negative  -     Start amoxicillin (AMOXIL) 875 mg tablet; Take 1 Tab by mouth two (2) times a day for 10 days. -      tylenol/advil for pain. 2. Sore throat  -     AMB POC RAPID STREP A is positive. -     AMB POC RAPID INFLUENZA TEST is negative    3. Cough  -   start  benzonatate (TESSALON) 200 mg capsule; Take 1 Cap by mouth three (3) times daily as needed for Cough for up to 7 days. Medication risks/benefits/costs/interactions/alternatives discussed with patient. Advised patient to call back or return to office if symptoms worsen/change/persist. If patient cannot reach us or should anything more severe/urgent arise he/she should proceed directly to the nearest emergency department. Discussed expected course/resolution/complications of diagnosis in detail with patient. Patient given a written after visit summary which includes her diagnoses, current medications and vitals. Patient expressed understanding with the diagnosis and plan.        Follow-up Disposition:  Return if symptoms worsen or fail to improve.

## 2018-06-04 DIAGNOSIS — F41.8 ANXIETY ASSOCIATED WITH DEPRESSION: ICD-10-CM

## 2018-06-04 RX ORDER — VENLAFAXINE HYDROCHLORIDE 37.5 MG/1
CAPSULE, EXTENDED RELEASE ORAL
Qty: 90 CAP | Refills: 0 | Status: SHIPPED | OUTPATIENT
Start: 2018-06-04 | End: 2018-09-03 | Stop reason: SDUPTHER

## 2018-12-04 DIAGNOSIS — F41.8 ANXIETY ASSOCIATED WITH DEPRESSION: ICD-10-CM

## 2018-12-04 RX ORDER — VENLAFAXINE HYDROCHLORIDE 37.5 MG/1
CAPSULE, EXTENDED RELEASE ORAL
Qty: 90 CAP | Refills: 0 | OUTPATIENT
Start: 2018-12-04

## 2018-12-07 ENCOUNTER — OFFICE VISIT (OUTPATIENT)
Dept: PRIMARY CARE CLINIC | Age: 33
End: 2018-12-07

## 2018-12-07 VITALS
RESPIRATION RATE: 16 BRPM | BODY MASS INDEX: 27.7 KG/M2 | HEART RATE: 81 BPM | HEIGHT: 69 IN | DIASTOLIC BLOOD PRESSURE: 78 MMHG | WEIGHT: 187 LBS | SYSTOLIC BLOOD PRESSURE: 110 MMHG | OXYGEN SATURATION: 98 % | TEMPERATURE: 98.8 F

## 2018-12-07 DIAGNOSIS — F41.8 ANXIETY ASSOCIATED WITH DEPRESSION: Primary | ICD-10-CM

## 2018-12-07 DIAGNOSIS — G47.00 INSOMNIA, UNSPECIFIED TYPE: ICD-10-CM

## 2018-12-07 RX ORDER — VENLAFAXINE HYDROCHLORIDE 37.5 MG/1
CAPSULE, EXTENDED RELEASE ORAL
Qty: 90 CAP | Refills: 1 | Status: SHIPPED | OUTPATIENT
Start: 2018-12-07 | End: 2019-04-10 | Stop reason: DRUGHIGH

## 2018-12-07 RX ORDER — ZOLPIDEM TARTRATE 5 MG/1
5 TABLET ORAL
Qty: 20 TAB | Refills: 0 | Status: SHIPPED | OUTPATIENT
Start: 2018-12-07 | End: 2019-11-11 | Stop reason: SDUPTHER

## 2018-12-07 NOTE — PROGRESS NOTES
Chief Complaint   Patient presents with    Medication Refill     effexor    Insomnia     over 1 year     1. Have you been to the ER, urgent care clinic since your last visit? Hospitalized since your last visit? No    2. Have you seen or consulted any other health care providers outside of the [facility] since your last visit? Include any pap smears or colon screening.  No

## 2018-12-07 NOTE — PROGRESS NOTES
Subjective:     Chief Complaint   Patient presents with    Medication Refill     effexor    Insomnia     over 1 year        She  is a 29y.o. year old female who presents today for  follow up on anxiety and depression. She is currently on Effexor 37.5 mg . She reports that her anxiety and depression has been well controlled with Effexor. Patient also here with a c/o having sleeping difficulty for the past one year. States that she has trouble going to sleep as well as staying asleep. She had been taking OTC sleep aids such as z quil, melatolin but nothing seems to working . She us here asking for help. Denies any sleep apnea. Pertinent items are noted in HPI. Objective:     Vitals:    12/07/18 0947   BP: 110/78   Pulse: 81   Resp: 16   Temp: 98.8 °F (37.1 °C)   TempSrc: Oral   SpO2: 98%   Weight: 187 lb (84.8 kg)   Height: 5' 9\" (1.753 m)       Physical Examination: General appearance - alert, well appearing, and in no distress, oriented to person, place, and time and overweight  Mental status - alert, oriented to person, place, and time, normal mood, behavior, speech, dress, motor activity, and thought processes  Chest - clear to auscultation, no wheezes, rales or rhonchi, symmetric air entry  Heart - normal rate, regular rhythm, normal S1, S2, no murmurs, rubs, clicks or gallops    No Known Allergies   Social History     Socioeconomic History    Marital status:      Spouse name: Not on file    Number of children: Not on file    Years of education: Not on file    Highest education level: Not on file   Tobacco Use    Smoking status: Never Smoker    Smokeless tobacco: Never Used   Substance and Sexual Activity    Alcohol use:  Yes     Alcohol/week: 4.2 oz     Types: 7 Glasses of wine per week     Comment: glass of wine with dinner    Drug use: No    Sexual activity: Yes     Partners: Male     Birth control/protection: None      Family History   Problem Relation Age of Onset    Bipolar Disorder Father     Diabetes Brother     No Known Problems Brother       Past Surgical History:   Procedure Laterality Date    HX SEPTOPLASTY      HX WISDOM TEETH EXTRACTION        History reviewed. No pertinent past medical history. Current Outpatient Medications   Medication Sig Dispense Refill    venlafaxine-SR (EFFEXOR-XR) 37.5 mg capsule TAKE 1 CAP BY MOUTH DAILY. INDICATIONS: ANXIETY WITH DEPRESSION 90 Cap 0        Assessment/ Plan:   Diagnoses and all orders for this visit:    1. Anxiety associated with depression  -   Well controlled with  venlafaxine-SR (EFFEXOR-XR) 37.5 mg capsule; TAKE 1 CAP BY MOUTH DAILY. INDICATIONS: ANXIETY WITH DEPRESSION    2. Insomnia, unspecified type  -   Patient had tried OTC sleep aid for the past one year but nothing is working. Start  zolpidem (AMBIEN) 5 mg tablet; Take 1 Tab by mouth nightly as needed for Sleep. Max Daily Amount: 5 mg. Medication risks/benefits/costs/interactions/alternatives discussed with patient. Advised patient to call back or return to office if symptoms worsen/change/persist. If patient cannot reach us or should anything more severe/urgent arise he/she should proceed directly to the nearest emergency department. Discussed expected course/resolution/complications of diagnosis in detail with patient. Patient given a written after visit summary which includes her diagnoses, current medications and vitals. Patient expressed understanding with the diagnosis and plan. Follow-up Disposition:  Return in about 1 month (around 1/7/2019) for complete physical  and fasting blood work., have fasting blood work done 2-3 days prior. Venessa Hung

## 2019-04-03 ENCOUNTER — OFFICE VISIT (OUTPATIENT)
Dept: PRIMARY CARE CLINIC | Age: 34
End: 2019-04-03

## 2019-04-03 VITALS
TEMPERATURE: 98.1 F | SYSTOLIC BLOOD PRESSURE: 117 MMHG | OXYGEN SATURATION: 97 % | RESPIRATION RATE: 17 BRPM | WEIGHT: 197.8 LBS | DIASTOLIC BLOOD PRESSURE: 73 MMHG | HEIGHT: 69 IN | HEART RATE: 96 BPM | BODY MASS INDEX: 29.3 KG/M2

## 2019-04-03 DIAGNOSIS — M54.50 ACUTE RIGHT-SIDED LOW BACK PAIN WITHOUT SCIATICA: Primary | ICD-10-CM

## 2019-04-03 RX ORDER — METHOCARBAMOL 750 MG/1
750 TABLET, FILM COATED ORAL 3 TIMES DAILY
Qty: 21 TAB | Refills: 0 | Status: SHIPPED | OUTPATIENT
Start: 2019-04-03 | End: 2019-10-18 | Stop reason: SDUPTHER

## 2019-04-03 RX ORDER — IBUPROFEN 600 MG/1
600 TABLET ORAL
Qty: 30 TAB | Refills: 0 | Status: SHIPPED | OUTPATIENT
Start: 2019-04-03 | End: 2019-05-13 | Stop reason: SDUPTHER

## 2019-04-03 NOTE — PROGRESS NOTES
HPI:     Chief Complaint   Patient presents with    Back Pain     back last week. Seen a chiropractor, no improvement. Constant pain in lower right side. Taking excedrin        Patient is a 29 y.o. female with significant history of depression, anxiety who presents for evaluation of right sided low back pain. Reports pain started about a week ago and has not gotten any better or worse. Thinks she may have pulled muscle from recent heavy lifting. She just moved and is doing lot of lifting and bending with unpacking and also has horses and does a lot of lifting with feed bags and hay. Describes pain as achy and 6/10 in severity. Standing and bending over exacerbates the pain. She denies associated LE radiating pain, weakness, numbness, paresthesias, fever, hematuria, dysuria, loss of bowel or bladder control, saddle anesthesia. Does not have history of prior back problems. Has been using Excedrin and warm soaks which helps somewhat. Has tried back brace for support but this has not done much. Went to walk in chiropractor yesterday without improvement. Patient Active Problem List   Diagnosis Code    Anxiety associated with depression F41.8    Insomnia G47.00     Current Outpatient Medications   Medication Sig Dispense Refill    ibuprofen (MOTRIN) 600 mg tablet Take 1 Tab by mouth every eight (8) hours as needed for Pain. 30 Tab 0    methocarbamol (ROBAXIN) 750 mg tablet Take 1 Tab by mouth three (3) times daily. 21 Tab 0    venlafaxine-SR (EFFEXOR-XR) 37.5 mg capsule TAKE 1 CAP BY MOUTH DAILY. INDICATIONS: ANXIETY WITH DEPRESSION 90 Cap 1    zolpidem (AMBIEN) 5 mg tablet Take 1 Tab by mouth nightly as needed for Sleep. Max Daily Amount: 5 mg. 20 Tab 0     No Known Allergies  History reviewed. No pertinent past medical history. ROS:   Pertinent items are noted in HPI.       Objective:     Vitals:    04/03/19 1529   BP: 117/73   Pulse: 96   Resp: 17   Temp: 98.1 °F (36.7 °C)   TempSrc: Oral SpO2: 97%   Weight: 197 lb 12.8 oz (89.7 kg)   Height: 5' 9\" (1.753 m)        Vitals and Nurse Documentation reviewed. Physical Examination:   General appearance - alert, well appearing, and in no distress  Mental status - alert, oriented to person, place, and time, normal mood, behavior, speech, dress, motor activity, and thought processes  Eyes - pupils equal and reactive  Chest - clear to auscultation, no wheezes, rales or rhonchi, symmetric air entry  Heart - normal rate, regular rhythm, normal S1, S2, no murmurs, rubs, clicks or gallops  Back exam - full range of motion, slight tenderness of lumbar paraspinal musculature on right, no vertebral point tenderness, no palpable spasm or pain on motion, sacroiliac joints and sciatic notches nontender, negative straight-leg raise bilaterally, normal reflexes and strength bilateral lower extremities  Neurological - alert, oriented, normal speech, no focal findings or movement disorder noted, DTR's normal and symmetric, normal muscle tone, no tremors, strength 5/5  Extremities - peripheral pulses normal, no pedal edema, no clubbing or cyanosis      Assessment/ Plan:   Diagnoses and all orders for this visit:    1. Acute right-sided low back pain without sciatica  -     Neurologically intact on exam.  Differential dx reviewed with the patient, favor muscular. Will treat with: heat, ice, NSAIDs, rest, stretching, meds below. Red flags were reviewed with the patient to RTC or notify me, expected time course for resolution reviewed. -     ibuprofen (MOTRIN) 600 mg tablet; Take 1 Tab by mouth every eight (8) hours as needed for Pain. Advised to take NSAID with food. CV and GI adverse effects discussed with patient. No history of PUD or GI bleed. Patient is not on anticoagulant or steroid. Advised not to take ibuprofen and any other NSAID concurrently. -     methocarbamol (ROBAXIN) 750 mg tablet; Take 1 Tab by mouth three (3) times daily.   Medication benefits, risks, indication, dosage, potential adverse effects, and alternate medication options were discussed with patient who expressed understanding. Discussed that med may cause drowsiness, sedation and to take before bedtime. Do not drive or drink alcohol while taking this medication.         -     Advised to avoid any strenuous activity, lifting or pushing heavy objects until feeling better. -     Patient to follow-up with any worsening. Follow-up and Dispositions    · Return if symptoms worsen or fail to improve. I have discussed the diagnosis with the patient and the intended plan as seen in the above orders. Advised prompt follow-up if symptoms worsen or fail to improve and symptoms that would warrant emergent evaluation in ED. The patient has received an after-visit summary and questions were answered concerning future plans. I have discussed medication side effects and warnings with the patient as well. Patient expressed understanding and is in agreement with the diagnosis and plan.

## 2019-04-03 NOTE — PATIENT INSTRUCTIONS

## 2019-04-10 ENCOUNTER — OFFICE VISIT (OUTPATIENT)
Dept: PRIMARY CARE CLINIC | Age: 34
End: 2019-04-10

## 2019-04-10 VITALS
SYSTOLIC BLOOD PRESSURE: 119 MMHG | DIASTOLIC BLOOD PRESSURE: 83 MMHG | BODY MASS INDEX: 29.33 KG/M2 | OXYGEN SATURATION: 100 % | HEART RATE: 60 BPM | HEIGHT: 69 IN | TEMPERATURE: 98 F | WEIGHT: 198 LBS | RESPIRATION RATE: 16 BRPM

## 2019-04-10 DIAGNOSIS — F41.8 ANXIETY ASSOCIATED WITH DEPRESSION: Primary | ICD-10-CM

## 2019-04-10 RX ORDER — PAROXETINE HYDROCHLORIDE 20 MG/1
20 TABLET, FILM COATED ORAL DAILY
Qty: 30 TAB | Refills: 2 | Status: SHIPPED | OUTPATIENT
Start: 2019-04-10 | End: 2019-05-08 | Stop reason: SDUPTHER

## 2019-04-10 NOTE — PROGRESS NOTES
Chief Complaint   Patient presents with    Medication Evaluation     would like a medication just for anxiety, would like to be taken off of effexor       1. Have you been to the ER, urgent care clinic since your last visit? Hospitalized since your last visit? No    2. Have you seen or consulted any other health care providers outside of the [address] [place] [address] since your last visit? Include any pap smears or colon screening.  No

## 2019-04-10 NOTE — PROGRESS NOTES
Subjective:     Chief Complaint   Patient presents with    Medication Evaluation     would like a medication just for anxiety, would like to be taken off of effexor        She  is a 29y.o. year old female who presents today with a request to discuss alterative med for anxiety. She reports that she still has anxiety but she does not think she is depressed anymore. She is currently on Effexor 37.5 mg Capsule. She is asking for something that that can work only of anxiety. She states that she has two young children which can be very stressful. Pertinent items are noted in HPI. Objective:     Vitals:    04/10/19 1214   BP: 119/83   Pulse: 60   Resp: 16   Temp: 98 °F (36.7 °C)   TempSrc: Oral   SpO2: 100%   Weight: 198 lb (89.8 kg)   Height: 5' 9\" (1.753 m)       Physical Examination: General appearance - alert, well appearing, and in no distress, oriented to person, place, and time and overweight  Mental status - alert, oriented to person, place, and time, normal mood, behavior, speech, dress, motor activity, and thought processes  Chest - clear to auscultation, no wheezes, rales or rhonchi, symmetric air entry  Heart - normal rate, regular rhythm, normal S1, S2, no murmurs, rubs, clicks or gallops    No Known Allergies   Social History     Socioeconomic History    Marital status:      Spouse name: Not on file    Number of children: Not on file    Years of education: Not on file    Highest education level: Not on file   Tobacco Use    Smoking status: Never Smoker    Smokeless tobacco: Never Used   Substance and Sexual Activity    Alcohol use:  Yes     Alcohol/week: 4.2 oz     Types: 7 Glasses of wine per week     Comment: glass of wine with dinner    Drug use: No    Sexual activity: Yes     Partners: Male     Birth control/protection: None      Family History   Problem Relation Age of Onset    Bipolar Disorder Father     Diabetes Brother     No Known Problems Brother       Past Surgical History:   Procedure Laterality Date    HX SEPTOPLASTY      HX WISDOM TEETH EXTRACTION        History reviewed. No pertinent past medical history. Current Outpatient Medications   Medication Sig Dispense Refill    PARoxetine (PAXIL) 20 mg tablet Take 1 Tab by mouth daily. 30 Tab 2    ibuprofen (MOTRIN) 600 mg tablet Take 1 Tab by mouth every eight (8) hours as needed for Pain. 30 Tab 0    methocarbamol (ROBAXIN) 750 mg tablet Take 1 Tab by mouth three (3) times daily. 21 Tab 0    zolpidem (AMBIEN) 5 mg tablet Take 1 Tab by mouth nightly as needed for Sleep. Max Daily Amount: 5 mg. 20 Tab 0        Assessment/ Plan:   Diagnoses and all orders for this visit:    1. Anxiety associated with depression  -   I have talked about Buspar and benzo for anxiety . Discussed that to control her anxiety she needs to take Buspar most likely three times /day. Patient like to avoid benzo. Patient did not want to take Buspar for the frequency of doses. She would like to try Paxil since she will have better chance to taper down and stop this medicine easier Vs Capsule form of Effexor. PARoxetine (PAXIL) 20 mg tablet; Take 1 Tab by mouth daily. Medication risks/benefits/costs/interactions/alternatives discussed with patient. Advised patient to call back or return to office if symptoms worsen/change/persist. If patient cannot reach us or should anything more severe/urgent arise he/she should proceed directly to the nearest emergency department. Discussed expected course/resolution/complications of diagnosis in detail with patient. Patient given a written after visit summary which includes her diagnoses, current medications and vitals. Patient expressed understanding with the diagnosis and plan. Follow-up and Dispositions    · Return in about 3 months (around 7/10/2019) for anxiety and depression. Leigh Ann Roscoe

## 2019-05-07 DIAGNOSIS — F41.8 ANXIETY ASSOCIATED WITH DEPRESSION: ICD-10-CM

## 2019-05-07 RX ORDER — PAROXETINE HYDROCHLORIDE 20 MG/1
20 TABLET, FILM COATED ORAL DAILY
Qty: 30 TAB | Refills: 2 | OUTPATIENT
Start: 2019-05-07

## 2019-05-08 DIAGNOSIS — F41.8 ANXIETY ASSOCIATED WITH DEPRESSION: ICD-10-CM

## 2019-05-08 RX ORDER — PAROXETINE HYDROCHLORIDE 20 MG/1
20 TABLET, FILM COATED ORAL DAILY
Qty: 90 TAB | Refills: 0 | Status: SHIPPED | OUTPATIENT
Start: 2019-05-08 | End: 2019-08-09 | Stop reason: SDUPTHER

## 2019-05-13 ENCOUNTER — HOSPITAL ENCOUNTER (OUTPATIENT)
Dept: ULTRASOUND IMAGING | Age: 34
Discharge: HOME | End: 2019-05-13
Attending: FAMILY MEDICINE
Payer: MEDICAID

## 2019-05-13 ENCOUNTER — OFFICE VISIT (OUTPATIENT)
Dept: PRIMARY CARE CLINIC | Age: 34
End: 2019-05-13

## 2019-05-13 VITALS
HEIGHT: 69 IN | TEMPERATURE: 98 F | OXYGEN SATURATION: 98 % | SYSTOLIC BLOOD PRESSURE: 125 MMHG | DIASTOLIC BLOOD PRESSURE: 85 MMHG | RESPIRATION RATE: 16 BRPM | HEART RATE: 89 BPM | WEIGHT: 199.4 LBS | BODY MASS INDEX: 29.53 KG/M2

## 2019-05-13 DIAGNOSIS — M79.604 PAIN OF RIGHT LOWER EXTREMITY: Primary | ICD-10-CM

## 2019-05-13 DIAGNOSIS — M79.604 PAIN OF RIGHT LOWER EXTREMITY: ICD-10-CM

## 2019-05-13 PROCEDURE — 93971 EXTREMITY STUDY: CPT

## 2019-05-13 RX ORDER — IBUPROFEN 600 MG/1
600 TABLET ORAL
Qty: 30 TAB | Refills: 0 | Status: SHIPPED | OUTPATIENT
Start: 2019-05-13 | End: 2019-10-16 | Stop reason: SDUPTHER

## 2019-05-13 NOTE — PROGRESS NOTES
Subjective:     Chief Complaint   Patient presents with    Leg Pain     right leg throbbing pain, hurts while sitting and stading, consistant pain, x1 month, has not noticed any discoloration    Medication Evaluation     Paxil- gave horrible bleeding with period    Medication Refill     IBU        She  is a 29y.o. year old female who presents today with a c/i continue to have right leg pain. . Reports that she was seen by NP last month for right lower back and leg pain. Reports that back pain has resolved but she continue to have pain in her right lower leg. Its throbbing pain, mostly in her calf goes down up to the ankle. Walking or standing triggers the pain. No swelling, injury. Reports that Ibuprofen eases the pain. Would like to get refill. Mention that she travelled by car to [facility] about a week ago other than that no travel history. Denies any cough, chest pain, soa. Pertinent items are noted in HPI. Objective:     Vitals:    05/13/19 1557   BP: 125/85   Pulse: 89   Resp: 16   Temp: 98 °F (36.7 °C)   TempSrc: Oral   SpO2: 98%   Weight: 199 lb 6.4 oz (90.4 kg)   Height: 5' 9\" (1.753 m)       Physical Examination: General appearance - alert, well appearing, and in no distress, oriented to person, place, and time and overweight  Mental status - alert, oriented to person, place, and time, normal mood, behavior, speech, dress, motor activity, and thought processes  Musculoskeletal - Knee: no swelling. Slight tenderness in lateral side of the joint line, no deformity or swelling, no calf tenderness.   Extremities - peripheral pulses normal, no pedal edema, no clubbing or cyanosis, no pedal edema noted    No Known Allergies   Social History     Socioeconomic History    Marital status:      Spouse name: Not on file    Number of children: Not on file    Years of education: Not on file    Highest education level: Not on file   Tobacco Use    Smoking status: Never Smoker    Smokeless tobacco: Never Used   Substance and Sexual Activity    Alcohol use: Yes     Alcohol/week: 4.2 oz     Types: 7 Glasses of wine per week     Comment: glass of wine with dinner    Drug use: No    Sexual activity: Yes     Partners: Male     Birth control/protection: None      Family History   Problem Relation Age of Onset    Bipolar Disorder Father     Diabetes Brother     No Known Problems Brother       Past Surgical History:   Procedure Laterality Date    HX SEPTOPLASTY      HX WISDOM TEETH EXTRACTION        History reviewed. No pertinent past medical history. Current Outpatient Medications   Medication Sig Dispense Refill    PARoxetine (PAXIL) 20 mg tablet Take 1 Tab by mouth daily. 90 Tab 0    ibuprofen (MOTRIN) 600 mg tablet Take 1 Tab by mouth every eight (8) hours as needed for Pain. 30 Tab 0    methocarbamol (ROBAXIN) 750 mg tablet Take 1 Tab by mouth three (3) times daily. 21 Tab 0    zolpidem (AMBIEN) 5 mg tablet Take 1 Tab by mouth nightly as needed for Sleep. Max Daily Amount: 5 mg. 20 Tab 0        Assessment/ Plan:   Diagnoses and all orders for this visit:    1. Pain of right lower extremity  -     ibuprofen (MOTRIN) 600 mg tablet; Take 1 Tab by mouth every eight (8) hours as needed for Pain.         -  Will r/o DVT. DUPLEX LOWER EXT VENOUS RIGHT; Future              Follow up if symptoms worsen. Medication risks/benefits/costs/interactions/alternatives discussed with patient. Advised patient to call back or return to office if symptoms worsen/change/persist. If patient cannot reach us or should anything more severe/urgent arise he/she should proceed directly to the nearest emergency department. Discussed expected course/resolution/complications of diagnosis in detail with patient. Patient given a written after visit summary which includes her diagnoses, current medications and vitals. Patient expressed understanding with the diagnosis and plan.           Follow-up and Dispositions    · Return if symptoms worsen or fail to improve.

## 2019-05-13 NOTE — PROGRESS NOTES
Chief Complaint   Patient presents with    Leg Pain     right leg throbbing pain, hurts while sitting and stading, consistant pain, x1 month, has not noticed any discoloration    Medication Evaluation     Paxil- gave horrible bleeding with period    Medication Refill     IBU     1. Have you been to the ER, urgent care clinic since your last visit? Hospitalized since your last visit? No    2. Have you seen or consulted any other health care providers outside of the [address] [place] [address] since your last visit? Include any pap smears or colon screening.  No

## 2019-05-14 ENCOUNTER — TELEPHONE (OUTPATIENT)
Dept: PRIMARY CARE CLINIC | Age: 34
End: 2019-05-14

## 2019-05-14 NOTE — TELEPHONE ENCOUNTER
----- Message from Sam Stevenson MD sent at 5/14/2019  2:57 PM EDT -----  Please call patient;    Doppler test is negative for blood clot.

## 2019-05-15 NOTE — TELEPHONE ENCOUNTER
Attempted to reach patient again to notify of negative doppler. Voicemail is full. Will mail a letter.

## 2019-06-13 ENCOUNTER — OFFICE VISIT (OUTPATIENT)
Dept: PRIMARY CARE CLINIC | Age: 34
End: 2019-06-13

## 2019-06-13 VITALS
DIASTOLIC BLOOD PRESSURE: 79 MMHG | BODY MASS INDEX: 29.89 KG/M2 | SYSTOLIC BLOOD PRESSURE: 114 MMHG | OXYGEN SATURATION: 98 % | TEMPERATURE: 98.2 F | HEIGHT: 69 IN | RESPIRATION RATE: 17 BRPM | WEIGHT: 201.8 LBS | HEART RATE: 65 BPM

## 2019-06-13 DIAGNOSIS — N92.0 MENORRHAGIA WITH REGULAR CYCLE: Primary | ICD-10-CM

## 2019-06-13 DIAGNOSIS — F41.8 ANXIETY ASSOCIATED WITH DEPRESSION: ICD-10-CM

## 2019-06-13 NOTE — PROGRESS NOTES
Subjective:     Chief Complaint   Patient presents with    Medication Evaluation     states that paxil has been making her periods heavy and would like to stop medications at this time. She  is a 29y.o. year old female who presents today with a concern that she had heavy period noted in last two cycles. Reports that period lasted for 5 days but its was very heavy. She believe paxil has been making her period heavy. Denies any abdominal pain, palpitation. She reports that her anxiety has been well controlled. She denies any depression. She would like to taper and stop the paxil. Pertinent items are noted in HPI. Objective:     Vitals:    06/13/19 1350   Resp: 17   Weight: 201 lb 12.8 oz (91.5 kg)   Height: 5' 9\" (1.753 m)       Physical Examination: General appearance - alert, well appearing, and in no distress, oriented to person, place, and time and overweight  Mental status - alert, oriented to person, place, and time, normal mood, behavior, speech, dress, motor activity, and thought processes  Neck - supple, no significant adenopathy, thyroid exam: thyroid is normal in size without nodules or tenderness  Chest - clear to auscultation, no wheezes, rales or rhonchi, symmetric air entry  Heart - normal rate, regular rhythm, normal S1, S2, no murmurs, rubs, clicks or gallops    No Known Allergies   Social History     Socioeconomic History    Marital status:      Spouse name: Not on file    Number of children: Not on file    Years of education: Not on file    Highest education level: Not on file   Tobacco Use    Smoking status: Never Smoker    Smokeless tobacco: Never Used   Substance and Sexual Activity    Alcohol use:  Yes     Alcohol/week: 4.2 oz     Types: 7 Glasses of wine per week     Comment: glass of wine with dinner    Drug use: No    Sexual activity: Yes     Partners: Male     Birth control/protection: None      Family History   Problem Relation Age of Onset    Bipolar Disorder Father     Diabetes Brother     No Known Problems Brother       Past Surgical History:   Procedure Laterality Date    HX SEPTOPLASTY      HX WISDOM TEETH EXTRACTION        History reviewed. No pertinent past medical history. Current Outpatient Medications   Medication Sig Dispense Refill    PARoxetine (PAXIL) 20 mg tablet Take 1 Tab by mouth daily. 90 Tab 0    zolpidem (AMBIEN) 5 mg tablet Take 1 Tab by mouth nightly as needed for Sleep. Max Daily Amount: 5 mg. 20 Tab 0    ibuprofen (MOTRIN) 600 mg tablet Take 1 Tab by mouth every eight (8) hours as needed for Pain. 30 Tab 0    methocarbamol (ROBAXIN) 750 mg tablet Take 1 Tab by mouth three (3) times daily. 21 Tab 0        Assessment/ Plan:   Diagnoses and all orders for this visit:    1. Menorrhagia with regular cycle  -    Heavy period noted in last two cycles, lasted for 5 days. Will check for  TSH AND FREE T4  -     CBC WITH AUTOMATED DIFF    2. Anxiety associated with depression       - patient reports that anxiety has been well controlled. Denies any depression. Would like to taper and stop the med. I agreed with her and discussed the tapering strategies. Medication risks/benefits/costs/interactions/alternatives discussed with patient. Advised patient to call back or return to office if symptoms worsen/change/persist. If patient cannot reach us or should anything more severe/urgent arise he/she should proceed directly to the nearest emergency department. Discussed expected course/resolution/complications of diagnosis in detail with patient. Patient given a written after visit summary which includes her diagnoses, current medications and vitals. Patient expressed understanding with the diagnosis and plan. Follow-up and Dispositions    · Return if symptoms worsen or fail to improve.

## 2019-06-14 LAB
BASOPHILS # BLD AUTO: 0 X10E3/UL (ref 0–0.2)
BASOPHILS NFR BLD AUTO: 0 %
EOSINOPHIL # BLD AUTO: 0.1 X10E3/UL (ref 0–0.4)
EOSINOPHIL NFR BLD AUTO: 2 %
ERYTHROCYTE [DISTWIDTH] IN BLOOD BY AUTOMATED COUNT: 13.7 % (ref 12.3–15.4)
HCT VFR BLD AUTO: 40.8 % (ref 34–46.6)
HGB BLD-MCNC: 12.8 G/DL (ref 11.1–15.9)
IMM GRANULOCYTES # BLD AUTO: 0 X10E3/UL (ref 0–0.1)
IMM GRANULOCYTES NFR BLD AUTO: 0 %
LYMPHOCYTES # BLD AUTO: 2.5 X10E3/UL (ref 0.7–3.1)
LYMPHOCYTES NFR BLD AUTO: 27 %
MCH RBC QN AUTO: 29 PG (ref 26.6–33)
MCHC RBC AUTO-ENTMCNC: 31.4 G/DL (ref 31.5–35.7)
MCV RBC AUTO: 93 FL (ref 79–97)
MONOCYTES # BLD AUTO: 0.3 X10E3/UL (ref 0.1–0.9)
MONOCYTES NFR BLD AUTO: 4 %
NEUTROPHILS # BLD AUTO: 6.2 X10E3/UL (ref 1.4–7)
NEUTROPHILS NFR BLD AUTO: 67 %
PLATELET # BLD AUTO: 328 X10E3/UL (ref 150–450)
RBC # BLD AUTO: 4.41 X10E6/UL (ref 3.77–5.28)
T4 FREE SERPL-MCNC: 0.81 NG/DL (ref 0.82–1.77)
TSH SERPL DL<=0.005 MIU/L-ACNC: 1.09 UIU/ML (ref 0.45–4.5)
WBC # BLD AUTO: 9.2 X10E3/UL (ref 3.4–10.8)

## 2019-06-17 NOTE — PROGRESS NOTES
Please call patient:    1. Mild abnormality noted on thyroid level . Not in any concerning range at this pont. Need a follow up in 6 months.     2. CBC, hemoglobin level is normal.

## 2019-08-08 ENCOUNTER — HOSPITAL ENCOUNTER (OUTPATIENT)
Dept: PHYSICAL THERAPY | Age: 34
Discharge: HOME | End: 2019-08-08
Payer: COMMERCIAL

## 2019-08-08 PROCEDURE — 97140 MANUAL THERAPY 1/> REGIONS: CPT | Performed by: PHYSICAL THERAPIST

## 2019-08-08 PROCEDURE — 97161 PT EVAL LOW COMPLEX 20 MIN: CPT | Performed by: PHYSICAL THERAPIST

## 2019-08-08 NOTE — PROGRESS NOTES
PT INITIAL EVALUATION NOTE - [facility] 2-15    Patient Name: Thaddeus Sheldon  Date:2019  : 1985  [x]  Patient  Verified  Payor: Chema Bailey / Plan: Chester Guo / Product Type: HMO /    In time:330 P  Out time:430 P  Total Treatment Time (min): 60 (45 eval, 15 timed see below)  Total Timed Codes (min): 15   1:1 Treatment Time ([facility]/[place]): 15    Visit #:1    Treatment Area: Left wrist pain [M25.532]    SUBJECTIVE  Any medication changes, allergies to medications, adverse drug reactions, diagnosis change, or new procedure performed?: [] No    [x] Yes (see summary sheet for update)      TERESA: , pt was thrown off her bolting horse, landing on her left wrist.  Pt went to [facility], had fractured her wrist.  Pt unsure to what extent (x-rays requested.)    Pt underwent surgery on 2019. She was put in a hard cast.    Cast was removed 3 weeks ago. Pt was then given a removable wrist brace that she has been wearing since. Pt unsure when she can remove the brace  Location of pain: left wrist palmar-ulnar aspect   Description of pain: throbbing  Pain:   4-5/10 max 2/10 min 1-2/10 now     Aggravated by: typing, farm chores (moving animals, goats), opening door, turning door knob, putting kids in carseats, putting on kids shoes  Eased by: rest  UE tingling/numbness:   [] Yes   [x] No  Occupation: Pt works at [employer]. Also is a , has written one book. Social: lives in [place] with her , and two kids (1 adopted son who is two, 1 daughter two is 5)  Prior level of function/activity level: able to type 90 words per min, farm chores, open doors, put kids in carseats, put on kids shoes without pain  Patient goal: \"typing ability restored; able to have better wrist turning ability\"  PMH:  WNL     Observation: scar noted along distal radius. 7 cm long, fully approximated.   Hypersensitivity noted over area    ROM (L) wrist  *note: all directions graves by pain  Ext: A: 21 deg, P: 35 deg  Flexion: A: 23 deg, P: 28 deg  UD: A: 0 deg, P: 2  RD: 10 deg, P: 18 deg    Elbow supination: A: 5 deg, P: 10 deg     Wrist MMT:  2-/5 all planes-pain with all directions     Flexibility:             Wrist flexors: dec  Wrist extensors: dec     Palpation: tenderness over wrist flexors, supinator, pronator teres     Joint mobility:  Dec distal radio-ulnar mobility, decreased carpal mobility dorsal and palmar glide     OBJECTIVE  [x] Skin assessment post-treatment:  [x]intact []redness- no adverse reaction    []redness  adverse reaction:     15 min Therapeutic Exercise:  [x] See flow sheet :   Rationale: increase ROM and increase strength to improve the patients ability to type and lift children. With   [x] TE   [] manual   [] self care    Patient Education: [x] Review HEP    [] Progressed/Changed HEP based on:   [] positioning   [] body mechanics   [] transfers   [x] Ice application- pt advised to ice 10-15 min 1-2 x/day to area in order to dec inflammation  [x] other:  re: mechanism of injury/condition, role of physical therapy, prognosis for recovery, heat vs ice, activity modifications. Education re: advised taking brace off every few hours to allow wrist to move. Pain Level (0-10 scale) post treatment: 2    ASSESSMENT/Changes in Function:     [x]  See Plan of Gregory.  Vance PT, DPT, CMTPT  PT License Number: [id number]   8/8/2019  3:29 PM

## 2019-08-08 NOTE — PROGRESS NOTES
[facility]  [address]  [place], [address]  Phone: [phone]  Fax: [phone]    Plan of Care/Statement of Necessity for Physical Therapy Services  2-15    Patient name: Jodi Handy  : 1985  Provider#: [id number]  Referral source:      Medical/Treatment Diagnosis: Left wrist pain [M25.532]     Prior Hospitalization: see medical history     Comorbidities: see evaluation  Prior Level of Function:see evaluation  Medications: Verified on Patient Summary List  Start of Care: 2019     Onset Date:see evaluation   The Plan of Care and following information is based on the information from the initial evaluation. Assessment/ key information: Patient is s/p (L) wrist fx and subsequent ORIF and will benefit from physical therapy to address deficits noted below in problem list.     Problem List: pain affecting function, decrease ROM, decrease strength, edema affecting function, decrease ADL/ functional abilitiies, decrease activity tolerance and decrease flexibility/ joint mobility   Treatment Plan may include any combination of the following: Therapeutic exercise, Therapeutic activities, Neuromuscular re-education, Physical agent/modality, Manual therapy, Patient education and Self Care training  Patient / Family readiness to learn indicated by: asking questions, trying to perform skills and interest  Persons(s) to be included in education: patient (P)  Barriers to Learning/Limitations: None  Patient Goal (s): please see evaluation in [facility]  Patient Self Reported Health Status: please see paper chart  Rehabilitation Potential: good    Short Term Goals:  To be accomplished in 10 treatments:  -Independent in HEP as evidenced on ability to perform at least 5 exercises from HEP using proper form without verbal cuing.   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 Frequency / Duration: Patient to be seen 2 times per week for 10-12 weeks. Patient/ Caregiver education and instruction: self care, activity modification and exercises    [x]  Plan of care has been reviewed with PTA    Certification Period: 8/8/2019 -  11/8/19    Kaylee Hawkins. Vance, PT, DPT, CMTPT      4/5/3886 3:27 PM  PT License Number: [id number]  _____________________________________________________________________    I certify that the above Therapy Services are being furnished while the patient is under my care. I agree with the treatment plan and certify that this therapy is necessary.     [de-identified] Signature:____________________  Date:____________Time:_________

## 2019-08-09 DIAGNOSIS — F41.8 ANXIETY ASSOCIATED WITH DEPRESSION: ICD-10-CM

## 2019-08-09 RX ORDER — PAROXETINE HYDROCHLORIDE 20 MG/1
TABLET, FILM COATED ORAL
Qty: 90 TAB | Refills: 0 | Status: SHIPPED | OUTPATIENT
Start: 2019-08-09 | End: 2019-11-14 | Stop reason: SDUPTHER

## 2019-08-12 ENCOUNTER — HOSPITAL ENCOUNTER (OUTPATIENT)
Dept: PHYSICAL THERAPY | Age: 34
Discharge: HOME | End: 2019-08-12
Payer: COMMERCIAL

## 2019-08-12 PROCEDURE — 97140 MANUAL THERAPY 1/> REGIONS: CPT | Performed by: PHYSICAL THERAPIST

## 2019-08-12 PROCEDURE — 97110 THERAPEUTIC EXERCISES: CPT | Performed by: PHYSICAL THERAPIST

## 2019-08-12 NOTE — PROGRESS NOTES
PT DAILY TREATMENT NOTE - [facility] 2-15    Patient Name: Meliton Portillo  Date:2019  : 1985  [x]  Patient  Verified  Payor: Tarn García / Plan: Jevon Morrison / Product Type: HMO /    In time:210 P  Out time:255 P  Total Treatment Time (min): 45  Total Timed Codes (min): 45  1:1 Treatment Time ([facility]/[place]): 45   Visit #: 2     Treatment Area: Left wrist pain [M25.532]    SUBJECTIVE  Pain Level (0-10 scale): 2  Any medication changes, allergies to medications, adverse drug reactions, diagnosis change, or new procedure performed?: [x] No    [] Yes (see summary sheet for update)  Subjective functional status/changes:     \"I haven't been icing-need to get that ice pack still. I Have been taking the brace off a bit more while at home. \"    OBJECTIVE  [x] Skin assessment post-treatment:  [x]intact []redness- no adverse reaction    []redness  adverse reaction:   bag to go min [x]  Ice     []  Heat  Position:n/a  Location: n/a   Rationale: decrease edema, decrease inflammation, decrease pain and reduce soreness post therapy in order to improve the patients ability to perform ADL's. 30 min Therapeutic Exercise:  [x] See flow sheet :   Rationale: increase ROM, increase strength and improve functional mobility to improve the patients ability to open door    15 min Manual Therapy: Wrist PROM all planes. Hand joint mobs.      Rationale: decrease pain, increase ROM, increase tissue extensibility, decrease trigger points and improve joint mobility to improve the patients ability to type    With   [x] TE   [] manual   [] self care Patient Education: [x] Review HEP    [] Progressed/Changed HEP based on:   [] positioning   [] body mechanics   [] transfers   [] heat/ice application    [x] other: advised pain free range for all     Other Objective/Functional Measures:     Pain Level (0-10 scale) post treatment: 1    ASSESSMENT    Patient will continue to benefit from skilled PT services to modify and progress therapeutic interventions, address functional mobility deficits, address ROM deficits, address strength deficits and analyze and address soft tissue restrictions to attain remaining goals. Progress towards goals / Updated goals:  Improved A/PROM noted today    PLAN  []  Upgrade activities as tolerated     [x]  Continue plan of care  []  Update interventions per flow sheet       []  Discharge due to:_  []  Other:_      Anette Ramirez.  Vance PT, DPT, CMTPT    0/97/7985    PT License Number: [id number]

## 2019-08-13 ENCOUNTER — APPOINTMENT (OUTPATIENT)
Dept: PHYSICAL THERAPY | Age: 34
End: 2019-08-13
Payer: COMMERCIAL

## 2019-08-15 ENCOUNTER — HOSPITAL ENCOUNTER (OUTPATIENT)
Dept: PHYSICAL THERAPY | Age: 34
Discharge: HOME | End: 2019-08-15
Payer: COMMERCIAL

## 2019-08-15 PROCEDURE — 97110 THERAPEUTIC EXERCISES: CPT | Performed by: PHYSICAL THERAPIST

## 2019-08-15 PROCEDURE — 97140 MANUAL THERAPY 1/> REGIONS: CPT | Performed by: PHYSICAL THERAPIST

## 2019-08-15 NOTE — PROGRESS NOTES
PT DAILY TREATMENT NOTE - [facility] 2-15    Patient Name: Meliton Portillo  Date:8/15/2019  : 1985  [x]  Patient  Verified  Payor: Tran García / Plan: Jevon Morrison / Product Type: HMO /    In time:405 P   Out time:500 P   Total Treatment Time (min): 55  Total Timed Codes (min): 45  1:1 Treatment Time ([facility]/[place]): 40  Visit #: 3    Treatment Area: Left wrist pain [M25.532]    SUBJECTIVE  Pain Level (0-10 scale): 2  Any medication changes, allergies to medications, adverse drug reactions, diagnosis change, or new procedure performed?: [x] No    [] Yes (see summary sheet for update)  Subjective functional status/changes:     \"I tried calling the [facility] office about the brace. They didn't ever call me back, so I have been taking it off more. I have been icing more. \"    OBJECTIVE  [x] Skin assessment post-treatment:  [x]intact []redness- no adverse reaction    []redness  adverse reaction:   10 min [x]  Ice     []  Heat  Position:n/a  Location: n/a   Rationale: decrease edema, decrease inflammation, decrease pain and reduce soreness post therapy in order to improve the patients ability to perform ADL's. 15 min Therapeutic Exercise:  [x] See flow sheet :   Rationale: increase ROM, increase strength and improve functional mobility to improve the patients ability to open door    25 min Manual Therapy: Wrist PROM all planes. Hand joint mobs.   STM   Rationale: decrease pain, increase ROM, increase tissue extensibility, decrease trigger points and improve joint mobility to improve the patients ability to type    With   [x] TE   [] manual   [] self care Patient Education: [x] Review HEP    [] Progressed/Changed HEP based on:   [] positioning   [] body mechanics   [] transfers   [] heat/ice application    [x] other: advised pt call [facility] office and re-request RX and op      Other Objective/Functional Measures:     Pain Level (0-10 scale) post treatment: 1    ASSESSMENT    Patient will continue to benefit from skilled PT services to modify and progress therapeutic interventions, address functional mobility deficits, address ROM deficits, address strength deficits and analyze and address soft tissue restrictions to attain remaining goals. Progress towards goals / Updated goals:      PLAN  []  Upgrade activities as tolerated     [x]  Continue plan of care  []  Update interventions per flow sheet       []  Discharge due to:_  []  Other:_      Kaylee Woodard PT, DPT, CMTPT    1/00/2325    PT License Number: [id number]

## 2019-08-20 ENCOUNTER — HOSPITAL ENCOUNTER (OUTPATIENT)
Dept: PHYSICAL THERAPY | Age: 34
Discharge: HOME | End: 2019-08-20
Payer: COMMERCIAL

## 2019-08-20 PROCEDURE — 97110 THERAPEUTIC EXERCISES: CPT | Performed by: PHYSICAL THERAPIST

## 2019-08-20 PROCEDURE — 97140 MANUAL THERAPY 1/> REGIONS: CPT | Performed by: PHYSICAL THERAPIST

## 2019-08-22 ENCOUNTER — HOSPITAL ENCOUNTER (OUTPATIENT)
Dept: PHYSICAL THERAPY | Age: 34
Discharge: HOME | End: 2019-08-22
Payer: COMMERCIAL

## 2019-08-22 PROCEDURE — 97110 THERAPEUTIC EXERCISES: CPT | Performed by: PHYSICAL THERAPIST

## 2019-08-22 PROCEDURE — 97140 MANUAL THERAPY 1/> REGIONS: CPT | Performed by: PHYSICAL THERAPIST

## 2019-08-22 NOTE — PROGRESS NOTES
PT DAILY TREATMENT NOTE - [facility] 2-15    Patient Name: Markos Bennett  Date:2019  : 1985  [x]  Patient  Verified  Payor: Rebecca Gottlieb / Plan: Shar Manzano / Product Type: HMO /    In time:330 P   Out time: 425 P  Total Treatment Time (min): 55  Total Timed Codes (min):  45  1:1 Treatment Time ([facility]/[place]): 40  Visit #: 5    Treatment Area: Left wrist pain [M25.532]    SUBJECTIVE  Pain Level (0-10 scale): 1  Any medication changes, allergies to medications, adverse drug reactions, diagnosis change, or new procedure performed?: [x] No    [] Yes (see summary sheet for update)  Subjective functional status/changes:     \"I can notice the exercises are helping. Getting easier typing\"    OBJECTIVE  [x] Skin assessment post-treatment:  [x]intact []redness- no adverse reaction    []redness  adverse reaction:   10 min [x]  Ice     []  Heat  Position:seated, elbow supported  Location: left wrist   Rationale: decrease edema, decrease inflammation, decrease pain and reduce soreness post therapy in order to improve the patients ability to perform ADL's. 15 min Therapeutic Exercise:  [x] See flow sheet :   Rationale: increase ROM, increase strength and improve functional mobility to improve the patients ability to open door    30 min Manual Therapy: Wrist PROM all planes. Hand joint mobs.   STM   Rationale: decrease pain, increase ROM, increase tissue extensibility, decrease trigger points and improve joint mobility to improve the patients ability to type    With   [x] TE   [] manual   [] self care Patient Education: [x] Review HEP    [] Progressed/Changed HEP based on:   [] positioning   [] body mechanics   [] transfers   [] heat/ice application    [] other:      Other Objective/Functional Measures:     Pain Level (0-10 scale) post treatment: 1    ASSESSMENT    Patient will continue to benefit from skilled PT services to modify and progress therapeutic interventions, address functional mobility deficits, address ROM deficits, address strength deficits and analyze and address soft tissue restrictions to attain remaining goals. Progress towards goals / Updated goals:      PLAN  []  Upgrade activities as tolerated     [x]  Continue plan of care  []  Update interventions per flow sheet       []  Discharge due to:_  []  Other:_      Abhilash Sandhu.  Vance PT, DPT, CMTPT    1/99/2549    PT License Number: [id number]

## 2019-08-27 ENCOUNTER — APPOINTMENT (OUTPATIENT)
Dept: PHYSICAL THERAPY | Age: 34
End: 2019-08-27
Payer: COMMERCIAL

## 2019-08-29 ENCOUNTER — HOSPITAL ENCOUNTER (OUTPATIENT)
Dept: PHYSICAL THERAPY | Age: 34
Discharge: HOME | End: 2019-08-29
Payer: COMMERCIAL

## 2019-08-29 PROCEDURE — 97140 MANUAL THERAPY 1/> REGIONS: CPT | Performed by: PHYSICAL THERAPIST

## 2019-08-29 PROCEDURE — 97110 THERAPEUTIC EXERCISES: CPT | Performed by: PHYSICAL THERAPIST

## 2019-08-29 NOTE — PROGRESS NOTES
PT DAILY TREATMENT NOTE - [facility] 2-15    Patient Name: Arielle Hood  Date:2019  : 1985  [x]  Patient  Verified  Payor: Ant Emery / Plan: Ayah Weathers / Product Type: HMO /    In time:330 P   Out time: 425 P   Total Treatment Time (min): 55  Total Timed Codes (min):  55  1:1 Treatment Time ([facility]/[place]): 40  Visit #: 6    Treatment Area: Left wrist pain [M25.532]    SUBJECTIVE  Pain Level (0-10 scale): 2  Any medication changes, allergies to medications, adverse drug reactions, diagnosis change, or new procedure performed?: [x] No    [] Yes (see summary sheet for update)  Subjective functional status/changes:     \"I havent been icing as much as I should have, but I've tried driving with both hands and I think it has been making it sore\"    OBJECTIVE  [x] Skin assessment post-treatment:  [x]intact []redness- no adverse reaction    []redness  adverse reaction:   To go min [x]  Ice     []  Heat  Position:seated, elbow supported  Location: left wrist   Rationale: decrease edema, decrease inflammation, decrease pain and reduce soreness post therapy in order to improve the patients ability to perform ADL's.     25 min Therapeutic Exercise:  [x] See flow sheet :   Rationale: increase ROM, increase strength and improve functional mobility to improve the patients ability to open door    30 min Manual Therapy: Wrist PROM all planes. Hand joint mobs.   STM   Rationale: decrease pain, increase ROM, increase tissue extensibility, decrease trigger points and improve joint mobility to improve the patients ability to type    With   [x] TE   [] manual   [] self care Patient Education: [x] Review HEP    [] Progressed/Changed HEP based on:   [] positioning   [] body mechanics   [] transfers   [] heat/ice application    [] other:      Other Objective/Functional Measures:     Pain Level (0-10 scale) post treatment: sore    ASSESSMENT    Patient will continue to benefit from skilled PT services to modify and progress therapeutic interventions, address functional mobility deficits, address ROM deficits, address strength deficits and analyze and address soft tissue restrictions to attain remaining goals. Progress towards goals / Updated goals:      PLAN  []  Upgrade activities as tolerated     [x]  Continue plan of care  []  Update interventions per flow sheet       []  Discharge due to:_  []  Other:_      Tay Peters.  Vance PT, DPT, CMTPT    9/06/2742    PT License Number: [id number]

## 2019-09-04 ENCOUNTER — HOSPITAL ENCOUNTER (OUTPATIENT)
Dept: PHYSICAL THERAPY | Age: 34
Discharge: HOME | End: 2019-09-04
Payer: COMMERCIAL

## 2019-09-04 PROCEDURE — 97110 THERAPEUTIC EXERCISES: CPT | Performed by: PHYSICAL THERAPIST

## 2019-09-04 PROCEDURE — 97140 MANUAL THERAPY 1/> REGIONS: CPT | Performed by: PHYSICAL THERAPIST

## 2019-09-26 ENCOUNTER — HOSPITAL ENCOUNTER (OUTPATIENT)
Dept: PHYSICAL THERAPY | Age: 34
Discharge: HOME | End: 2019-09-26
Payer: COMMERCIAL

## 2019-09-26 PROCEDURE — 97140 MANUAL THERAPY 1/> REGIONS: CPT | Performed by: PHYSICAL THERAPIST

## 2019-09-26 PROCEDURE — 97110 THERAPEUTIC EXERCISES: CPT | Performed by: PHYSICAL THERAPIST

## 2019-09-26 NOTE — PROGRESS NOTES
PT DAILY TREATMENT NOTE/RE-ASSESSMENT - [facility] 2-15    Patient Name: Becky Bender  Date:2019  : 1985  [x]  Patient  Verified  Payor: Duron Frank / Plan: Summer Morgan / Product Type: HMO /    In [id number] P    Out time: 510 P   Total Treatment Time (min): 65  Total Timed Codes (min):   65  1:1 Treatment Time ([facility]/[place]): 40  Visit #:8    Treatment Area: Left wrist pain [M25.532]    SUBJECTIVE  Pain Level (0-10 scale):0  Any medication changes, allergies to medications, adverse drug reactions, diagnosis change, or new procedure performed?: [x] No    [] Yes (see summary sheet for update)  Subjective functional status/changes:     \"I have had a more difficult time getting in because of work. My wrist has been feeling much better. Much less pain, able to do more. I was feeling overzealous and tried to ride a bike. It was a bad decision. Morena Briones \"    OBJECTIVE  [x] Skin assessment post-treatment:  [x]intact []redness- no adverse reaction    []redness - adverse reaction:   To go min [x]  Ice     []  Heat  Position:seated, elbow supported  Location: left wrist   Rationale: decrease edema, decrease inflammation, decrease pain and reduce soreness post therapy in order to improve the patients ability to perform ADL's. 35 min Therapeutic Exercise:  [x] See flow sheet :   Rationale: increase ROM, increase strength and improve functional mobility to improve the patients ability to open door    30 min Manual Therapy: Wrist PROM all planes. Hand joint mobs.   STM   Rationale: decrease pain, increase ROM, increase tissue extensibility, decrease trigger points and improve joint mobility to improve the patients ability to type    With   [x] TE   [] manual   [] self care Patient Education: [x] Review HEP    [] Progressed/Changed HEP based on:   [] positioning   [] body mechanics   [] transfers   [] heat/ice application    [] other:      Other Objective/Functional Measures:   ROM (L) wrist  *note: all directions graves by pain  Ext: A: 35deg, P: 50 deg  Flexion: A:30 deg, P:35 deg  UD: A:5 deg, P: 7  RD: 13 deg, P: 23 deg     Elbow supination: A: 10 deg, P: 25 deg     Wrist MMT:  3+/5 within avail planes for all planes   Flexibility:             Wrist flexors: dec  Wrist extensors: dec     Palpation: tenderness over wrist flexors, supinator, pronator teres      Joint mobility:  Dec distal radio-ulnar mobility, decreased carpal mobility dorsal and palmar glide  Pain Level (0-10 scale) post treatment: 0    ASSESSMENT    Patient will continue to benefit from skilled PT services to modify and progress therapeutic interventions, address functional mobility deficits, address ROM deficits, address strength deficits and analyze and address soft tissue restrictions to attain remaining goals. Progress towards goals / Updated goals:    Short Term Goals: To be accomplished in 10 treatments:  -Independent in HEP as evidenced on ability to perform at least 5 exercises from HEP using proper form without verbal cuing. MET  -Pt will report compliance with icing 1-2x/day in order to decrease inflammationMET  -Pt will be able to open door knob with 50% greater ease-MET     Long Term Goals: To be accomplished in 15-20  treatments:  -AROM and PROM equal to contralateral side and pain-free to allow pt to move animals and [place] around [place]. 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 -PROGRESSING    Pt with strength, ROM, and dexterity improved since initial evaluation. Pt will benefit from further therapy to help pt reach all goals. PLAN  []  Upgrade activities as tolerated     [x]  Continue plan of care  []  Update interventions per flow sheet       []  Discharge due to:_  []  Other:_      Duc Edwards.  Vance, PT, DPT, CMTPT 2/70/2695    PT License Number: [id number]

## 2019-10-01 ENCOUNTER — APPOINTMENT (OUTPATIENT)
Dept: PHYSICAL THERAPY | Age: 34
End: 2019-10-01
Payer: COMMERCIAL

## 2019-10-03 ENCOUNTER — APPOINTMENT (OUTPATIENT)
Dept: PHYSICAL THERAPY | Age: 34
End: 2019-10-03
Payer: COMMERCIAL

## 2019-10-08 ENCOUNTER — APPOINTMENT (OUTPATIENT)
Dept: PHYSICAL THERAPY | Age: 34
End: 2019-10-08
Payer: COMMERCIAL

## 2019-10-10 ENCOUNTER — APPOINTMENT (OUTPATIENT)
Dept: PHYSICAL THERAPY | Age: 34
End: 2019-10-10
Payer: COMMERCIAL

## 2019-10-16 ENCOUNTER — HOSPITAL ENCOUNTER (OUTPATIENT)
Dept: GENERAL RADIOLOGY | Age: 34
Discharge: HOME | End: 2019-10-16
Attending: NURSE PRACTITIONER
Payer: COMMERCIAL

## 2019-10-16 ENCOUNTER — TELEPHONE (OUTPATIENT)
Dept: PRIMARY CARE CLINIC | Age: 34
End: 2019-10-16

## 2019-10-16 ENCOUNTER — OFFICE VISIT (OUTPATIENT)
Dept: PRIMARY CARE CLINIC | Age: 34
End: 2019-10-16

## 2019-10-16 VITALS
OXYGEN SATURATION: 99 % | RESPIRATION RATE: 16 BRPM | SYSTOLIC BLOOD PRESSURE: 116 MMHG | HEIGHT: 69 IN | TEMPERATURE: 97.7 F | BODY MASS INDEX: 30.3 KG/M2 | DIASTOLIC BLOOD PRESSURE: 75 MMHG | HEART RATE: 69 BPM | WEIGHT: 204.6 LBS

## 2019-10-16 DIAGNOSIS — Z87.39 HISTORY OF SCOLIOSIS: ICD-10-CM

## 2019-10-16 DIAGNOSIS — M54.41 ACUTE RIGHT-SIDED LOW BACK PAIN WITH RIGHT-SIDED SCIATICA: Primary | ICD-10-CM

## 2019-10-16 DIAGNOSIS — M54.41 ACUTE RIGHT-SIDED LOW BACK PAIN WITH RIGHT-SIDED SCIATICA: ICD-10-CM

## 2019-10-16 DIAGNOSIS — M79.604 PAIN OF RIGHT LOWER EXTREMITY: ICD-10-CM

## 2019-10-16 PROCEDURE — 72100 X-RAY EXAM L-S SPINE 2/3 VWS: CPT

## 2019-10-16 RX ORDER — IBUPROFEN 600 MG/1
600 TABLET ORAL
Qty: 30 TAB | Refills: 0 | Status: SHIPPED | OUTPATIENT
Start: 2019-10-16 | End: 2019-11-11 | Stop reason: SDUPTHER

## 2019-10-16 NOTE — PROGRESS NOTES
HPI:     Chief Complaint   Patient presents with    Leg Pain     right leg pain, starts at lower back and goes down to foot, has trouble driving sometimes and foot becomes tingly, noticed a couple months ago, has gotten worse over the last 2 weeks. Tried taking naproxen/aleeve and no relief. Patient is a 36 y.o. female with significant history of depression, anxiety who presents for evaluation of right sided low back pain with radiation down right leg. Patient reports having right sided low back pain with radiation down back of right leg into toes intermittently over the past year, with recent worsening over the past 2 weeks. Denies any injury or inciting event. Has been evaluated couple times in the past for similar issue. Describes pain as achy, cramping and 6/10 in severity. Seems worse when she is driving when it can be 8/10 in intensity. Denies LE weakness, tingling, but sometimes has numbness in her right toes. Denies fevers, urinary symptoms, loss of bowel or bladder control, saddle anesthesia. Denies erythema, swelling, warmth in calf or thigh. Had negative RLE duplex ultrasound in May. Reports hx of mild scoliosis as a teenager. Has been taking Aleve without relief. Ibuprofen prescribed in the past usually helps a lot. Is currently in PT for her left wrist injury. Patient Active Problem List   Diagnosis Code    Anxiety associated with depression F41.8    Insomnia G47.00     Current Outpatient Medications   Medication Sig Dispense Refill    ibuprofen (MOTRIN) 600 mg tablet Take 1 Tab by mouth every eight (8) hours as needed for Pain. 30 Tab 0    PARoxetine (PAXIL) 20 mg tablet TAKE 1 TABLET BY MOUTH EVERY DAY 90 Tab 0    methocarbamol (ROBAXIN) 750 mg tablet Take 1 Tab by mouth three (3) times daily. 21 Tab 0    zolpidem (AMBIEN) 5 mg tablet Take 1 Tab by mouth nightly as needed for Sleep. Max Daily Amount: 5 mg. 20 Tab 0     No Known Allergies  History reviewed.  No pertinent past medical history. ROS:   Pertinent items are noted in HPI. Objective:     Vitals:    10/16/19 1504   BP: 116/75   Pulse: 69   Resp: 16   Temp: 97.7 °F (36.5 °C)   TempSrc: Oral   SpO2: 99%   Weight: 204 lb 9.6 oz (92.8 kg)   Height: 5' 9\" (1.753 m)        Vitals and Nurse Documentation reviewed. Physical Examination:   General appearance - alert, well appearing, and in no distress  Mental status - alert, oriented to person, place, and time, normal mood, behavior, speech, dress, motor activity, and thought processes  Chest - clear to auscultation, no wheezes, rales or rhonchi, symmetric air entry  Heart - normal rate, regular rhythm, normal S1, S2, no murmurs, rubs, clicks or gallops  Abdomen - soft, nontender, nondistended, no masses or organomegaly  Back exam - slight tenderness with palpation of right lumbar paraspinal musculature and SI joint, no spinal tenderness or step off, ROM limited by patient discomfort, positive straight-leg raise on right, normal reflexes and strength bilateral lower extremities  Neurological - alert, oriented, normal speech, no focal findings or movement disorder noted, DTR's normal and symmetric, normal muscle tone, no tremors, strength 5/5  Extremities - peripheral pulses normal, no pedal edema, no clubbing or cyanosis      Assessment/ Plan:   Diagnoses and all orders for this visit:    1. Acute right-sided low back pain with right-sided sciatica  -     Patient with 1 year history intermittent right sided low back pain with radiation down back of right leg. Worsening over past 2 weeks. Neurologically intact, no concerning finding for cord compression, cauda equina. Will proceed with xray, ortho referral, and PT.    -     ibuprofen (MOTRIN) 600 mg tablet; Take 1 Tab by mouth every eight (8) hours as needed for Pain.  Medication benefits, risks, indication, dosage, potential adverse effects, and alternate medication options were discussed with patient who expressed understanding.   -     Advised avoidance of triggering activities, gentle stretching, heat/ice, massage, warm shower/soak. -     XR SPINE LUMB 2 OR 3 V; Future  -     REFERRAL TO ORTHOPEDICS  -     REFERRAL TO PHYSICAL THERAPY  -     Red flags and warning signs were reviewed with patient to return for follow-up or seek emergent evaluation in ED. 2. Pain of right lower extremity  -     ibuprofen (MOTRIN) 600 mg tablet; Take 1 Tab by mouth every eight (8) hours as needed for Pain.  -     REFERRAL TO ORTHOPEDICS  -     REFERRAL TO PHYSICAL THERAPY    3. History of scoliosis      Follow-up and Dispositions    · Return if symptoms worsen or fail to improve. I have discussed the diagnosis with the patient and the intended plan as seen in the above orders. Advised prompt follow-up if symptoms worsen or fail to improve and symptoms that would warrant emergent evaluation in ED. The patient has received an after-visit summary and questions were answered concerning future plans. I have discussed medication side effects and warnings with the patient as well. Patient expressed understanding and is in agreement with the diagnosis and plan.

## 2019-10-16 NOTE — PATIENT INSTRUCTIONS
Low Back Pain: Exercises  Introduction  Here are some examples of exercises for you to try. The exercises may be suggested for a condition or for rehabilitation. Start each exercise slowly. Ease off the exercises if you start to have pain. You will be told when to start these exercises and which ones will work best for you. How to do the exercises  Press-up    1. Lie on your stomach, supporting your body with your forearms. 2. Press your elbows down into the floor to raise your upper back. As you do this, relax your stomach muscles and allow your back to arch without using your back muscles. As your press up, do not let your hips or pelvis come off the floor. 3. Hold for 15 to 30 seconds, then relax. 4. Repeat 2 to 4 times. Alternate arm and leg (bird dog) exercise    1. Start on the floor, on your hands and knees. 2. Tighten your belly muscles. 3. Raise one leg off the floor, and hold it straight out behind you. Be careful not to let your hip drop down, because that will twist your trunk. 4. Hold for about 6 seconds, then lower your leg and switch to the other leg. 5. Repeat 8 to 12 times on each leg. 6. Over time, work up to holding for 10 to 30 seconds each time. 7. If you feel stable and secure with your leg raised, try raising the opposite arm straight out in front of you at the same time. Knee-to-chest exercise    1. Lie on your back with your knees bent and your feet flat on the floor. 2. Bring one knee to your chest, keeping the other foot flat on the floor (or keeping the other leg straight, whichever feels better on your lower back). 3. Keep your lower back pressed to the floor. Hold for at least 15 to 30 seconds. 4. Relax, and lower the knee to the starting position. 5. Repeat with the other leg. Repeat 2 to 4 times with each leg. 6. To get more stretch, put your other leg flat on the floor while pulling your knee to your chest.    Curl-ups    1.  Lie on the floor on your back with your knees bent at a 90-degree angle. Your feet should be flat on the floor, about 12 inches from your buttocks. 2. Cross your arms over your chest. If this bothers your neck, try putting your hands behind your neck (not your head), with your elbows spread apart. 3. Slowly tighten your belly muscles and raise your shoulder blades off the floor. 4. Keep your head in line with your body, and do not press your chin to your chest.  5. Hold this position for 1 or 2 seconds, then slowly lower yourself back down to the floor. 6. Repeat 8 to 12 times. Pelvic tilt exercise    1. Lie on your back with your knees bent. 2. \"Brace\" your stomach. This means to tighten your muscles by pulling in and imagining your belly button moving toward your spine. You should feel like your back is pressing to the floor and your hips and pelvis are rocking back. 3. Hold for about 6 seconds while you breathe smoothly. 4. Repeat 8 to 12 times. Heel dig bridging    1. Lie on your back with both knees bent and your ankles bent so that only your heels are digging into the floor. Your knees should be bent about 90 degrees. 2. Then push your heels into the floor, squeeze your buttocks, and lift your hips off the floor until your shoulders, hips, and knees are all in a straight line. 3. Hold for about 6 seconds as you continue to breathe normally, and then slowly lower your hips back down to the floor and rest for up to 10 seconds. 4. Do 8 to 12 repetitions. Hamstring stretch in doorway    1. Lie on your back in a doorway, with one leg through the open door. 2. Slide your leg up the wall to straighten your knee. You should feel a gentle stretch down the back of your leg. 3. Hold the stretch for at least 15 to 30 seconds. Do not arch your back, point your toes, or bend either knee. Keep one heel touching the floor and the other heel touching the wall. 4. Repeat with your other leg. 5. Do 2 to 4 times for each leg.     Hip flexor stretch    1. Kneel on the floor with one knee bent and one leg behind you. Place your forward knee over your foot. Keep your other knee touching the floor. 2. Slowly push your hips forward until you feel a stretch in the upper thigh of your rear leg. 3. Hold the stretch for at least 15 to 30 seconds. Repeat with your other leg. 4. Do 2 to 4 times on each side. Wall sit    1. Stand with your back 10 to 12 inches away from a wall. 2. Lean into the wall until your back is flat against it. 3. Slowly slide down until your knees are slightly bent, pressing your lower back into the wall. 4. Hold for about 6 seconds, then slide back up the wall. 5. Repeat 8 to 12 times. Follow-up care is a key part of your treatment and safety. Be sure to make and go to all appointments, and call your doctor if you are having problems. It's also a good idea to know your test results and keep a list of the medicines you take. Where can you learn more? Go to http://ann-kaylah.info/. Enter K692 in the search box to learn more about \"Low Back Pain: Exercises. \"  Current as of: June 26, 2019  Content Version: 12.2  © 7326-7895 [employer]. Care instructions adapted under license by [employer] (which disclaims liability or warranty for this information). If you have questions about a medical condition or this instruction, always ask your healthcare professional. [place] [address] any warranty or liability for your use of this information. Low Back Pain: Exercises  Introduction  Here are some examples of exercises for you to try. The exercises may be suggested for a condition or for rehabilitation. Start each exercise slowly. Ease off the exercises if you start to have pain. You will be told when to start these exercises and which ones will work best for you. How to do the exercises  Press-up    5.  Lie on your stomach, supporting your body with your forearms. 6. Press your elbows down into the floor to raise your upper back. As you do this, relax your stomach muscles and allow your back to arch without using your back muscles. As your press up, do not let your hips or pelvis come off the floor. 7. Hold for 15 to 30 seconds, then relax. 8. Repeat 2 to 4 times. Alternate arm and leg (bird dog) exercise    8. Start on the floor, on your hands and knees. 9. Tighten your belly muscles. 10. Raise one leg off the floor, and hold it straight out behind you. Be careful not to let your hip drop down, because that will twist your trunk. 11. Hold for about 6 seconds, then lower your leg and switch to the other leg. 12. Repeat 8 to 12 times on each leg. 13. Over time, work up to holding for 10 to 30 seconds each time. 14. If you feel stable and secure with your leg raised, try raising the opposite arm straight out in front of you at the same time. Knee-to-chest exercise    7. Lie on your back with your knees bent and your feet flat on the floor. 8. Bring one knee to your chest, keeping the other foot flat on the floor (or keeping the other leg straight, whichever feels better on your lower back). 9. Keep your lower back pressed to the floor. Hold for at least 15 to 30 seconds. 10. Relax, and lower the knee to the starting position. 11. Repeat with the other leg. Repeat 2 to 4 times with each leg. 12. To get more stretch, put your other leg flat on the floor while pulling your knee to your chest.    Curl-ups    7. Lie on the floor on your back with your knees bent at a 90-degree angle. Your feet should be flat on the floor, about 12 inches from your buttocks. 8. Cross your arms over your chest. If this bothers your neck, try putting your hands behind your neck (not your head), with your elbows spread apart. 9. Slowly tighten your belly muscles and raise your shoulder blades off the floor.   10. Keep your head in line with your body, and do not press your chin to your chest.  11. Hold this position for 1 or 2 seconds, then slowly lower yourself back down to the floor. 12. Repeat 8 to 12 times. Pelvic tilt exercise    5. Lie on your back with your knees bent. 6. \"Brace\" your stomach. This means to tighten your muscles by pulling in and imagining your belly button moving toward your spine. You should feel like your back is pressing to the floor and your hips and pelvis are rocking back. 7. Hold for about 6 seconds while you breathe smoothly. 8. Repeat 8 to 12 times. Heel dig bridging    5. Lie on your back with both knees bent and your ankles bent so that only your heels are digging into the floor. Your knees should be bent about 90 degrees. 6. Then push your heels into the floor, squeeze your buttocks, and lift your hips off the floor until your shoulders, hips, and knees are all in a straight line. 7. Hold for about 6 seconds as you continue to breathe normally, and then slowly lower your hips back down to the floor and rest for up to 10 seconds. 8. Do 8 to 12 repetitions. Hamstring stretch in doorway    6. Lie on your back in a doorway, with one leg through the open door. 7. Slide your leg up the wall to straighten your knee. You should feel a gentle stretch down the back of your leg. 8. Hold the stretch for at least 15 to 30 seconds. Do not arch your back, point your toes, or bend either knee. Keep one heel touching the floor and the other heel touching the wall. 9. Repeat with your other leg. 10. Do 2 to 4 times for each leg. Hip flexor stretch    5. Kneel on the floor with one knee bent and one leg behind you. Place your forward knee over your foot. Keep your other knee touching the floor. 6. Slowly push your hips forward until you feel a stretch in the upper thigh of your rear leg. 7. Hold the stretch for at least 15 to 30 seconds. Repeat with your other leg. 8. Do 2 to 4 times on each side. Wall sit    6.  Stand with your back 10 to 12 inches away from a wall. 7. Lean into the wall until your back is flat against it. 8. Slowly slide down until your knees are slightly bent, pressing your lower back into the wall. 9. Hold for about 6 seconds, then slide back up the wall. 10. Repeat 8 to 12 times. Follow-up care is a key part of your treatment and safety. Be sure to make and go to all appointments, and call your doctor if you are having problems. It's also a good idea to know your test results and keep a list of the medicines you take. Where can you learn more? Go to http://ann-kaylah.info/. Enter R967 in the search box to learn more about \"Low Back Pain: Exercises. \"  Current as of: June 26, 2019  Content Version: 12.2  © 9356-8764 [employer]. Care instructions adapted under license by [employer] (which disclaims liability or warranty for this information). If you have questions about a medical condition or this instruction, always ask your healthcare professional. [address] any warranty or liability for your use of this information.

## 2019-10-16 NOTE — PROGRESS NOTES
Brittany Solorio is a 36 y.o. female    Chief Complaint   Patient presents with    Leg Pain     right leg pain, starts at lower back and goes down to foot, has trouble driving sometimes and foot becomes tingly, noticed a couple months ago, has gotten worse over the last 2 weeks. Tried taking naproxen/aleeve and no relief. 1. Have you been to the ER, urgent care clinic since your last visit? Hospitalized since your last visit? No    2. Have you seen or consulted any other health care providers outside of the [facility] since your last visit? Include any pap smears or colon screening. No    No flowsheet data found.      Health Maintenance Due   Topic Date Due    Influenza Age 5 to Adult  08/01/2019

## 2019-10-16 NOTE — TELEPHONE ENCOUNTER
----- Message from Erica William NP sent at [id number]/41/2953  4:47 PM EDT -----  Please notify patient:    Lumbar spine xray is normal, no acute abnormality.

## 2019-10-18 DIAGNOSIS — M54.41 ACUTE RIGHT-SIDED LOW BACK PAIN WITH RIGHT-SIDED SCIATICA: Primary | ICD-10-CM

## 2019-10-18 DIAGNOSIS — M54.50 ACUTE RIGHT-SIDED LOW BACK PAIN WITHOUT SCIATICA: ICD-10-CM

## 2019-10-18 RX ORDER — METHOCARBAMOL 750 MG/1
750 TABLET, FILM COATED ORAL
Qty: 21 TAB | Refills: 0 | Status: SHIPPED | OUTPATIENT
Start: 2019-10-18 | End: 2020-03-25

## 2019-10-22 ENCOUNTER — HOSPITAL ENCOUNTER (OUTPATIENT)
Dept: PHYSICAL THERAPY | Age: 34
Discharge: HOME | End: 2019-10-22
Payer: COMMERCIAL

## 2019-10-22 PROCEDURE — 97110 THERAPEUTIC EXERCISES: CPT | Performed by: PHYSICAL THERAPIST

## 2019-10-22 PROCEDURE — 97140 MANUAL THERAPY 1/> REGIONS: CPT | Performed by: PHYSICAL THERAPIST

## 2019-10-22 NOTE — PROGRESS NOTES
PT DAILY TREATMENT NOTE - [facility] 2-15    Patient Name: Leah Pickard  Date:10/22/2019  : 1985  [x]  Patient  Verified  Payor: Gail Briseno / Plan: Nathaniel Galvin / Product Type: HMO /    In [id number] A     Out time: 1110 A   Total Treatment Time (min): 70  Total Timed Codes (min):  60  1:1 Treatment Time ([facility]/[place]): 40  Visit #9    Treatment Area: Left wrist pain [M25.532]    SUBJECTIVE  Pain Level (0-10 scale):0  Any medication changes, allergies to medications, adverse drug reactions, diagnosis change, or new procedure performed?: [x] No    [] Yes (see summary sheet for update)  Subjective functional status/changes:     \"I have an easier time opening doors. \"    OBJECTIVE  [x] Skin assessment post-treatment:  [x]intact []redness- no adverse reaction    []redness - adverse reaction:   10 min [x]  Ice     []  Heat  Position:seated, elbow supported  Location: left wrist   Rationale: decrease edema, decrease inflammation, decrease pain and reduce soreness post therapy in order to improve the patients ability to perform ADL's. 45 min Therapeutic Exercise:  [x] See flow sheet :   Rationale: increase ROM, increase strength and improve functional mobility to improve the patients ability to open door    15 min Manual Therapy: Wrist PROM all planes. Hand joint mobs.   STM   Rationale: decrease pain, increase ROM, increase tissue extensibility, decrease trigger points and improve joint mobility to improve the patients ability to type    With   [x] TE   [] manual   [] self care Patient Education: [x] Review HEP    [] Progressed/Changed HEP based on:   [] positioning   [] body mechanics   [] transfers   [] heat/ice application    [x] other: pt with complaints of LBP/sciatica, advised prone to elbows x 10 or standing lumbar ext  X 10 q 1-2 hours      Other Objective/Functional Measures:     Pain Level (0-10 scale) post treatment: sore    ASSESSMENT    Patient will continue to benefit from skilled PT services to modify and progress therapeutic interventions, address functional mobility deficits, address ROM deficits, address strength deficits and analyze and address soft tissue restrictions to attain remaining goals. Progress towards goals / Updated goals:  Pt justin addition of UE weight bearing activities today without inc pain. PLAN  []  Upgrade activities as tolerated     [x]  Continue plan of care  []  Update interventions per flow sheet       []  Discharge due to:_  []  Other:_      Edilia Harmon.  CHAPINCITO Woodard, DPT, CMTPT    [id number]/19/7045    PT License Number: [id number]

## 2019-10-23 DIAGNOSIS — M54.41 ACUTE RIGHT-SIDED LOW BACK PAIN WITH RIGHT-SIDED SCIATICA: Primary | ICD-10-CM

## 2019-10-23 RX ORDER — METHYLPREDNISOLONE 4 MG/1
TABLET ORAL
Qty: 1 DOSE PACK | Refills: 0 | Status: SHIPPED | OUTPATIENT
Start: 2019-10-23 | End: 2020-03-25 | Stop reason: ALTCHOICE

## 2019-10-24 ENCOUNTER — APPOINTMENT (OUTPATIENT)
Dept: PHYSICAL THERAPY | Age: 34
End: 2019-10-24
Payer: COMMERCIAL

## 2019-10-29 ENCOUNTER — APPOINTMENT (OUTPATIENT)
Dept: PHYSICAL THERAPY | Age: 34
End: 2019-10-29
Payer: COMMERCIAL

## 2019-10-31 ENCOUNTER — APPOINTMENT (OUTPATIENT)
Dept: PHYSICAL THERAPY | Age: 34
End: 2019-10-31
Payer: COMMERCIAL

## 2019-11-09 DIAGNOSIS — M54.41 ACUTE RIGHT-SIDED LOW BACK PAIN WITH RIGHT-SIDED SCIATICA: ICD-10-CM

## 2019-11-09 DIAGNOSIS — G47.00 INSOMNIA, UNSPECIFIED TYPE: ICD-10-CM

## 2019-11-09 DIAGNOSIS — M79.604 PAIN OF RIGHT LOWER EXTREMITY: ICD-10-CM

## 2019-11-11 RX ORDER — ZOLPIDEM TARTRATE 5 MG/1
5 TABLET ORAL
Qty: 20 TAB | Refills: 0 | Status: SHIPPED | OUTPATIENT
Start: 2019-11-11 | End: 2019-12-16 | Stop reason: SDUPTHER

## 2019-11-11 RX ORDER — IBUPROFEN 600 MG/1
600 TABLET ORAL
Qty: 30 TAB | Refills: 0 | Status: SHIPPED | OUTPATIENT
Start: 2019-11-11 | End: 2020-10-28

## 2019-11-11 NOTE — TELEPHONE ENCOUNTER
From: Chi Patterson  Sent: 11/9/2019 9:41 AM EST  Subject: Medication Renewal Request    Chick Prakash Albarado would like a refill of the following medications:    Other - ibuprofen 600 mg tablet; ambien (had prescribed a while back, having trouble sleeping due to sciatica)    [facility]erred [facility] [address], [address]

## 2019-11-14 DIAGNOSIS — F41.8 ANXIETY ASSOCIATED WITH DEPRESSION: ICD-10-CM

## 2019-11-14 RX ORDER — PAROXETINE HYDROCHLORIDE 20 MG/1
TABLET, FILM COATED ORAL
Qty: 90 TAB | Refills: 0 | Status: SHIPPED | OUTPATIENT
Start: 2019-11-14 | End: 2020-02-06 | Stop reason: SDUPTHER

## 2019-12-16 DIAGNOSIS — G47.00 INSOMNIA, UNSPECIFIED TYPE: ICD-10-CM

## 2019-12-16 RX ORDER — ZOLPIDEM TARTRATE 5 MG/1
5 TABLET ORAL
Qty: 20 TAB | Refills: 0 | Status: SHIPPED | OUTPATIENT
Start: 2019-12-16 | End: 2020-01-06 | Stop reason: SDUPTHER

## 2020-03-25 ENCOUNTER — VIRTUAL VISIT (OUTPATIENT)
Dept: PRIMARY CARE CLINIC | Age: 35
End: 2020-03-25

## 2020-03-25 DIAGNOSIS — F41.8 ANXIETY ASSOCIATED WITH DEPRESSION: Primary | ICD-10-CM

## 2020-03-25 DIAGNOSIS — G47.00 INSOMNIA, UNSPECIFIED TYPE: ICD-10-CM

## 2020-03-25 RX ORDER — ZOLPIDEM TARTRATE 5 MG/1
5 TABLET ORAL
Qty: 15 TAB | Refills: 0 | Status: SHIPPED | OUTPATIENT
Start: 2020-03-25 | End: 2020-09-01

## 2020-03-25 NOTE — PROGRESS NOTES
[facility]  Physical Medicine & Rehab  Discharge Summary    Patient Name: Deonna Montero  MRN: [id number]  Admission Date: 8/14/2019  Hospital Length of Stay: 22 days  Discharge Date and Time:  09/05/2019  Attending Physician: Nicky Diamond MD  Discharging Provider: Nicky Diamond MD  Primary Care Physician: Bubba Tompkins MD      Admit Diagnosis:  CVA (cerebral vascular accident) [I63.9]    Discharge Diagnosis:  New onset left pontine and occipital ischemic infarct.    Secondary Diagnosis:   Active Hospital Problems    Diagnosis  POA    CVA (cerebral vascular accident) [I63.9]  Yes    Type 2 diabetes mellitus without complication [E11.9]  Yes      Resolved Hospital Problems   No resolved problems to display.       HPI: 78-year-old  right-hand-dominant female was initially admitted to [facility] with left-sided CVA with right hemiparesis on 07/23/2019 was hospitalized till 07/26/2019 for neuro workup including CT scan showed no acute changes, carotids without any significant stenosis, MRI consistent with left pontine as well as occipital ischemic infarcts.  Patient has been medically stabilized and transferred to swing bed with steady improvement and is now transferred down to us for further rehabilitation.    Past Medical History:   Diagnosis Date    Back pain     Diabetes mellitus     Hypertension     Reflux     Short-term memory loss     Stroke        Hospital Course:  Patient's course on the rehabilitation unit has been with steady progress.  Patient was confused on admit which is resolved.  Patient's blood pressures fluctuated throughout her stay here had to adjust medications and has been doing well.  Patient's blood sugars were elevated to and had to adjust insulin dose and has been doing fairly good with that.  Patient has been continent of bladder and bowel.  Patient persisted with some dysarthria and dysphagia which has steadily been  Rebecca Adams is a 36 y.o. female who was seen by synchronous (real-time) audio-video technology on 3/25/2020. Assessment & Plan:   Diagnoses and all orders for this visit:    1. Anxiety associated with depression      -  Paxil has been working well. Continue current med. 2. Insomnia, unspecified type  -     zolpidem (AMBIEN) 5 mg tablet; Take 1 Tab by mouth nightly as needed for Sleep. Max Daily Amount: 5 mg. Advised to try OTC benadryl as a sleep aid. CPT Codes 52810-38949 for Established Patients may apply to this Telehealth Visit  Time-based coding, delete if not needed: I spent at least 15 minutes with the patient and >50% of the time was spent counseling and/or coordinating care regarding her concerns     Subjective:   Rebecca Adams was seen for Insomnia and anxiety. Patient is a 36 y.o. female with significant history of depression, anxiety who presents for follow up on anxiety and depression as well as with acute concern. In November she re started on Paxil for anxiety and depression. States that Paxil has been doing really well for her. Patient has been having difficulty staying asleep or falling as sleep for last 1-2 months. She tried Ambien 5 mg in the past. Reports that  Melatonin, Z quil is no longer working for her. She think this COVID-19 is effecting her sleep. She is working from home with two young children. Waking up at 2 am and can't go back to sleep. Prior to Admission medications    Medication Sig Start Date End Date Taking? Authorizing Provider   zolpidem (AMBIEN) 5 mg tablet Take 1 Tab by mouth nightly as needed for Sleep. Max Daily Amount: 5 mg. 3/25/20  Yes Marck Armstrong MD   PARoxetine (PAXIL) 20 mg tablet TAKE 1 TABLET BY MOUTH EVERY DAY. MUST NEED APPOINTMENT 3/9/20   Marck Armstrong MD   ibuprofen (MOTRIN) 600 mg tablet Take 1 Tab by mouth every eight (8) hours as needed for Pain.  11/11/19   Iris Armstrong MD     No Known Allergies    Patient "improving.    Therapy wise patient's course on the rehab unit has been with steady progress.  Patient was needing supervision with the bed mobility, receptive language, expressive language, minimal assistance with transfers, wheelchair mobility, lower extremity dressing, bathing, memory, problem solving, contact guard assistance with ambulation about 100 ft, toileting, toilet transfers, standby assistance with feeding, grooming, setup with upper extremity dressing, continent of bladder and bowel, appetite was good, moderate assistance with tub transfers.  Patient's family participated in the training.  Patient is discharged home today with family to continue with the home health PT, OT, speech therapy and nursing care.  Patient have a follow-up appointment with PCP in 2-4 weeks.  Follow up with a neurologist in 4 weeks.    Discharged Condition: good    Disposition:  Home with home health services.    Follow Up:  Follow-up Information     Bubba Tompkins MD.    Specialty:  Family Medicine  Contact information:  [address] [place] [address]  [place] [address]  [phone]                   Patient Instructions:      WALKER FOR HOME USE     Order Specific Question Answer Comments   Type of Walker: Adult (5'4"-6'6")    With wheels? Yes    Height: 5' 8" (1.727 m)    Weight: 98.4 kg (217 lb)    Length of need (1-99 months): 99    Does patient have medical equipment at home? shower chair    Does patient have medical equipment at home? walker, rolling    Please check all that apply: Patient's condition impairs ambulation.    Please check all that apply: Patient is unable to safely ambulate without equipment.    Please check all that apply: Walker will be used for gait training.      3 IN 1 COMMODE FOR HOME USE     Order Specific Question Answer Comments   Type: Standard    Height: 5' 8" (1.727 m)    Weight: 98.4 kg (217 lb)    Does patient have medical equipment at home? shower chair    Does patient have medical equipment " Active Problem List   Diagnosis Code    Anxiety associated with depression F41.8    Insomnia G47.00     Current Outpatient Medications   Medication Sig Dispense Refill    zolpidem (AMBIEN) 5 mg tablet Take 1 Tab by mouth nightly as needed for Sleep. Max Daily Amount: 5 mg. 15 Tab 0    PARoxetine (PAXIL) 20 mg tablet TAKE 1 TABLET BY MOUTH EVERY DAY. MUST NEED APPOINTMENT 30 Tab 1    ibuprofen (MOTRIN) 600 mg tablet Take 1 Tab by mouth every eight (8) hours as needed for Pain. 30 Tab 0    methylPREDNISolone (MEDROL DOSEPACK) 4 mg tablet Follow package instructions. 1 Dose Pack 0    methocarbamol (ROBAXIN) 750 mg tablet Take 1 Tab by mouth three (3) times daily as needed for Pain. 21 Tab 0     No Known Allergies  No past medical history on file. Past Surgical History:   Procedure Laterality Date    HX SEPTOPLASTY      HX WISDOM TEETH EXTRACTION      HX WRIST FRACTURE TX Left 06/2019     Family History   Problem Relation Age of Onset    Bipolar Disorder Father     Diabetes Brother     No Known Problems Brother      Social History     Tobacco Use    Smoking status: Never Smoker    Smokeless tobacco: Never Used   Substance Use Topics    Alcohol use: Yes     Alcohol/week: 7.0 standard drinks     Types: 7 Glasses of wine per week     Comment: glass of wine with dinner        ROS: denies any cough, fever, chest pain, soa. Objective:     General: alert, cooperative, no distress   Mental  status: mental status: alert, oriented to person, place, and time, normal mood, behavior, speech, dress, motor activity, and thought processes   Resp: resp: normal effort and no respiratory distress   Neuro: neuro: no gross deficits   Skin: skin: no discoloration or lesions of concern on visible areas     Due to this being a TeleHealth evaluation, many elements of the physical examination are unable to be assessed. We discussed the expected course, resolution and complications of the diagnosis(es) in detail. Medication risks, benefits, costs, interactions, and alternatives were discussed as indicated. I advised her to contact the office if her condition worsens, changes or fails to improve as anticipated. She expressed understanding with the diagnosis(es) and plan. Pursuant to the emergency declaration under the [facility] waiver authority and the [employer] Act, this Virtual  Visit was conducted, with patient's consent, to reduce the patient's risk of exposure to COVID-19 and provide continuity of care for an established patient. Services were provided through a video synchronous discussion virtually to substitute for in-person clinic visit.     Jimbo Max MD at home? walker, rolling    Length of need (1-99 months): 99      Referral to Home health   Referral Priority: Routine Referral Type: Home Health   Referral Reason: Specialty Services Required   Requested Specialty: Home Health Services   Number of Visits Requested: 1       Medications:  Reconciled Home Medications:      Medication List      START taking these medications    acetaminophen 325 MG tablet  Commonly known as:  TYLENOL  Take 2 tablets (650 mg total) by mouth every 6 (six) hours as needed.     amLODIPine 10 MG tablet  Commonly known as:  NORVASC  Take 1 tablet (10 mg total) by mouth once daily.     aspirin 81 MG Chew  Take 1 tablet (81 mg total) by mouth once daily.  Replaces:  aspirin 325 MG tablet     lisinopril 20 MG tablet  Commonly known as:  PRINIVIL,ZESTRIL  Take 1 tablet (20 mg total) by mouth every evening.     meloxicam 7.5 MG tablet  Commonly known as:  MOBIC  Take 1 tablet (7.5 mg total) by mouth once daily.     pantoprazole 40 MG tablet  Commonly known as:  PROTONIX  Take 1 tablet (40 mg total) by mouth once daily.        CHANGE how you take these medications    * insulin  unit/mL injection  Inject 15 Units into the skin every evening.  What changed:  You were already taking a medication with the same name, and this prescription was added. Make sure you understand how and when to take each.     * insulin  unit/mL injection  Inject 20 Units into the skin once daily.  What changed:    · how much to take  · when to take this     metFORMIN 500 MG tablet  Commonly known as:  GLUCOPHAGE  Take 1 tablet (500 mg total) by mouth 2 (two) times daily with meals.  What changed:  when to take this     polyethylene glycol 17 gram/dose powder  Commonly known as:  GLYCOLAX  1 cap daily  What changed:    · how much to take  · how to take this  · when to take this  · reasons to take this  · additional instructions         * This list has 2 medication(s) that are the same as other medications  "prescribed for you. Read the directions carefully, and ask your doctor or other care provider to review them with you.            CONTINUE taking these medications    ALPHAGAN P 0.1 % Drop  Generic drug:  brimonidine 0.1%  Alphagan P 0.1 % eye drops     BD INSULIN SYRINGE ULT-FINE II 0.5 mL 31 gauge x 5/16" Syrg  Generic drug:  insulin syringe-needle U-100     BD INSULIN SYRINGE ULTRA-FINE 1 mL 31 gauge x 5/16 Syrg  Generic drug:  insulin syringe-needle U-100     * BD ULTRA-FINE SHORT PEN NEEDLE 31 gauge x 5/16" Ndle  Generic drug:  pen needle, diabetic     * BD ULTRA-FINE ORIG PEN NEEDLE 29 gauge x 1/2" Ndle  Generic drug:  pen needle, diabetic     COMBIGAN 0.2-0.5 % Drop  Generic drug:  brimonidine-timolol  Combigan 0.2 %-0.5 % eye drops     donepezil 10 MG tablet  Commonly known as:  ARICEPT  Take 10 mg by mouth every evening.     LUMIGAN 0.01 % Drop  Generic drug:  bimatoprost  Lumigan 0.01 % eye drops     magnesium hydroxide 400 mg/5 ml 400 mg/5 mL Susp  Commonly known as:  MILK OF MAGNESIA  Take 30 mLs (2,400 mg total) by mouth daily as needed.     metoprolol succinate 50 MG 24 hr tablet  Commonly known as:  TOPROL-XL  Take 50 mg by mouth.     ONETOUCH ULTRA TEST Strp  Generic drug:  blood sugar diagnostic     RESTORIL 15 mg Cap  Generic drug:  temazepam  Restoril 15 mg capsule   Take 1 capsule every day by oral route at bedtime.     traMADol 50 mg tablet  Commonly known as:  ULTRAM  tramadol 50 mg tablet   Take 1 tablet every 4-6 hours by oral route as needed.     TRAVATAN Z 0.004 % ophthalmic solution  Generic drug:  travoprost  Place 1 drop into the right eye 2 (two) times daily.         * This list has 2 medication(s) that are the same as other medications prescribed for you. Read the directions carefully, and ask your doctor or other care provider to review them with you.            STOP taking these medications    aspirin 325 MG tablet  Replaced by:  aspirin 81 MG Chew     LINZESS 145 mcg Cap " capsule  Generic drug:  linaCLOtide     NovoLOG Mix 70-30 U-100 Insuln 100 unit/mL (70-30) Soln  Generic drug:  insulin asp prt-insulin aspart (NOVOLOG 70/30)            Time spent on the discharge of patient: 20 minutes        Nicky Diamond MD  Department of Physical Medicine & Rehab  [facility]

## 2020-04-02 DIAGNOSIS — F41.8 ANXIETY ASSOCIATED WITH DEPRESSION: ICD-10-CM

## 2020-04-02 RX ORDER — PAROXETINE HYDROCHLORIDE 20 MG/1
TABLET, FILM COATED ORAL
Qty: 90 TAB | Refills: 0 | Status: SHIPPED | OUTPATIENT
Start: 2020-04-02 | End: 2020-09-01

## 2020-04-02 NOTE — TELEPHONE ENCOUNTER
Patient requesting a 90 day supply of Paxil to be sent to [facility] on file.

## 2020-06-08 ENCOUNTER — E-VISIT (OUTPATIENT)
Dept: PRIMARY CARE CLINIC | Age: 35
End: 2020-06-08

## 2020-06-15 ENCOUNTER — VIRTUAL VISIT (OUTPATIENT)
Dept: PRIMARY CARE CLINIC | Age: 35
End: 2020-06-15

## 2020-06-15 ENCOUNTER — E-VISIT (OUTPATIENT)
Dept: PRIMARY CARE CLINIC | Age: 35
End: 2020-06-15

## 2020-06-15 DIAGNOSIS — Z11.1 SCREENING-PULMONARY TB: Primary | ICD-10-CM

## 2020-06-15 DIAGNOSIS — F41.8 ANXIETY ASSOCIATED WITH DEPRESSION: ICD-10-CM

## 2020-06-15 NOTE — PROGRESS NOTES
Lyric Sharma is a 36 y.o. female who was seen by synchronous (real-time) audio-video technology on 6/15/2020. Consent: Lyric Sharma, who was seen by synchronous (real-time) audio-video technology, and/or her healthcare decision maker, is aware that this patient-initiated, Telehealth encounter on 6/15/2020 is a billable service, with coverage as determined by her insurance carrier. She is aware that she may receive a bill and has provided verbal consent to proceed: Yes. I was in the office while conducting this encounter. This virtual visit was conducted via [address] Pursuant to the emergency declaration under the [facility] waiver authority and the [employer] Act, this Virtual  Visit was conducted to reduce the patient's risk of exposure to COVID-19 and provide continuity of care for an established patient. Services were provided through a video synchronous discussion virtually to substitute for in-person clinic visit. Due to this being a TeleHealth evaluation, many elements of the physical examination are unable to be assessed. Assessment & Plan:     Diagnoses and all orders for this visit:    1. Screening-pulmonary TB  -     QUANTIFERON-TB GOLD PLUS              Patient need TB test done according to the form that she sent me through my chart. She will send us the Lab karthik fax # to fax over the lab requisition. 2. Anxiety associated with depression            Well controlled without any meds. Follow-up and Dispositions    · Return if symptoms worsen or fail to improve. Subjective:   Lyric Sharma is a 36 y.o. female who was seen for Other (Need TB screen for her foster care renewal. )    This is a 35 y/o F is here to get a clearance for foster care. 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  Need a renewal for the foster care.  Denies any fever, chills, night sweats, cough, sick contacts. Anxiety and depression has been well controlled. Reports that she weaned of herself the prozac a month ago . Doing fine. Denies any anxiety and depression. Prior to Admission medications    Medication Sig Start Date End Date Taking? Authorizing Provider   PARoxetine (PAXIL) 20 mg tablet TAKE 1 TABLET BY MOUTH EVERY DAY. 4/2/20  Yes Marck Armstrong MD   zolpidem (AMBIEN) 5 mg tablet Take 1 Tab by mouth nightly as needed for Sleep. Max Daily Amount: 5 mg. 3/25/20  Yes Marck Arsmtrong MD   ibuprofen (MOTRIN) 600 mg tablet Take 1 Tab by mouth every eight (8) hours as needed for Pain. 11/11/19   Coy Armstrong MD     No Known Allergies    Patient Active Problem List   Diagnosis Code    Anxiety associated with depression F41.8    Insomnia G47.00     Current Outpatient Medications   Medication Sig Dispense Refill    PARoxetine (PAXIL) 20 mg tablet TAKE 1 TABLET BY MOUTH EVERY DAY. 90 Tab 0    zolpidem (AMBIEN) 5 mg tablet Take 1 Tab by mouth nightly as needed for Sleep. Max Daily Amount: 5 mg. 15 Tab 0    ibuprofen (MOTRIN) 600 mg tablet Take 1 Tab by mouth every eight (8) hours as needed for Pain. 30 Tab 0     No Known Allergies  No past medical history on file. ROS    Refer to HPI. Objective:   Vital Signs: (As obtained by patient/caregiver at home)  There were no vitals taken for this visit.      [INSTRUCTIONS:  \"[x]\" Indicates a positive item  \"[]\" Indicates a negative item  -- DELETE ALL ITEMS NOT EXAMINED]    Constitutional: [x] Appears well-developed and well-nourished [x] No apparent distress      [] Abnormal -     Mental status: [x] Alert and awake  [x] Oriented to person/place/time [x] Able to follow commands    [] Abnormal -     Eyes:   EOM    [x]  Normal    [] Abnormal -   Sclera  [x]  Normal    [] Abnormal -          Discharge [x]  None visible   [] Abnormal -     HENT: [x] Normocephalic, atraumatic  [] Abnormal -   [x] Mouth/Throat: Mucous membranes are moist    External Ears [x] Normal  [] Abnormal -    Neck: [x] No visualized mass [] Abnormal -     Pulmonary/Chest: [x] Respiratory effort normal   [x] No visualized signs of difficulty breathing or respiratory distress        [] Abnormal -      Musculoskeletal:   [x] Normal gait with no signs of ataxia         [x] Normal range of motion of neck        [] Abnormal -     Neurological:        [x] No Facial Asymmetry (Cranial nerve 7 motor function) (limited exam due to video visit)          [x] No gaze palsy        [] Abnormal -          Skin:        [x] No significant exanthematous lesions or discoloration noted on facial skin         [] Abnormal -            Psychiatric:       [x] Normal Affect [] Abnormal -        [] No Hallucinations    Other pertinent observable physical exam findings:-        We discussed the expected course, resolution and complications of the diagnosis(es) in detail. Medication risks, benefits, costs, interactions, and alternatives were discussed as indicated. I advised her to contact the office if her condition worsens, changes or fails to improve as anticipated. She expressed understanding with the diagnosis(es) and plan. Jonathan Varela is a 36 y.o. female who was evaluated by a video visit encounter for concerns as above. Patient identification was verified prior to start of the visit. A caregiver was present when appropriate. Due to this being a TeleHealth encounter (During UXXMZ-00 public health emergency), evaluation of the following organ systems was limited: Vitals/Constitutional/EENT/Resp/CV/GI//MS/Neuro/Skin/Heme-Lymph-Imm.   Pursuant to the emergency declaration under the [address] [place] [address] and the [employer] and [employer] Act, this Virtual  Visit was conducted, with patient's (and/or legal guardian's) consent, to reduce the patient's risk of exposure to COVID-19 and provide necessary medical care. Services were provided through a video synchronous discussion virtually to substitute for in-person clinic visit.          Monse Estrada MD

## 2020-07-06 DIAGNOSIS — F41.8 ANXIETY ASSOCIATED WITH DEPRESSION: ICD-10-CM

## 2020-07-08 RX ORDER — PAROXETINE HYDROCHLORIDE 20 MG/1
TABLET, FILM COATED ORAL
Qty: 90 TAB | Refills: 0 | OUTPATIENT
Start: 2020-07-08

## 2020-08-26 DIAGNOSIS — F41.8 ANXIETY ASSOCIATED WITH DEPRESSION: ICD-10-CM

## 2020-08-26 RX ORDER — PAROXETINE HYDROCHLORIDE 20 MG/1
TABLET, FILM COATED ORAL
Qty: 90 TAB | Refills: 0 | OUTPATIENT
Start: 2020-08-26

## 2020-08-26 NOTE — TELEPHONE ENCOUNTER
Pharmacy is requesting refill of patient's paroxetine 20mg. I will send patient a message on [employer] to let her know she needs to make an appointment.

## 2020-08-26 NOTE — TELEPHONE ENCOUNTER
Pt told me last time that she stopped the med she is fine. I refused the med. Please send another message to her for clarification.

## 2020-08-31 ENCOUNTER — E-VISIT (OUTPATIENT)
Dept: PRIMARY CARE CLINIC | Age: 35
End: 2020-08-31

## 2020-09-01 ENCOUNTER — VIRTUAL VISIT (OUTPATIENT)
Dept: PRIMARY CARE CLINIC | Age: 35
End: 2020-09-01
Payer: COMMERCIAL

## 2020-09-01 DIAGNOSIS — R35.0 URINARY FREQUENCY: Primary | ICD-10-CM

## 2020-09-01 DIAGNOSIS — R82.998 URINE LEUKOCYTES: ICD-10-CM

## 2020-09-01 PROCEDURE — 99214 OFFICE O/P EST MOD 30 MIN: CPT | Performed by: FAMILY MEDICINE

## 2020-09-01 RX ORDER — SULFAMETHOXAZOLE AND TRIMETHOPRIM 800; 160 MG/1; MG/1
1 TABLET ORAL 2 TIMES DAILY
Qty: 10 TAB | Refills: 0 | Status: SHIPPED | OUTPATIENT
Start: 2020-09-01 | End: 2020-09-06

## 2020-09-01 NOTE — PROGRESS NOTES
Kaila Villagomez is a 36 y.o. female who was seen by synchronous (real-time) audio-video technology on 9/1/2020 for [employer]        Assessment & Plan:   Diagnoses and all orders for this visit:    1. Urinary frequency  -   Start   trimethoprim-sulfamethoxazole (BACTRIM DS, SEPTRA DS) 160-800 mg per tablet; Take 1 Tab by mouth two (2) times a day for 5 days. Strongly advised to follow up in office if symptoms are not better. Will check for BS. 2. Urine leukocytes  -     trimethoprim-sulfamethoxazole (BACTRIM DS, SEPTRA DS) 160-800 mg per tablet; Take 1 Tab by mouth two (2) times a day for 5 days. Follow-up and Dispositions    · Return if symptoms worsen or fail to improve. 712  Subjective: This is a 35 y/o F is here with a c/o having urinary frequency for the past on week associated with some fatigue from not sleeping. States that she has been waking up 4-5 times at night due to the urinary  frequency. Denies any burning urination, lower abdominal pain, blurry vision, N/V, flank pain, fever, chills. She had a  Urine spot test which showed faintly positive for bacteria. Prior to Admission medications    Medication Sig Start Date End Date Taking? Authorizing Provider   trimethoprim-sulfamethoxazole (BACTRIM DS, SEPTRA DS) 160-800 mg per tablet Take 1 Tab by mouth two (2) times a day for 5 days. 9/1/20 9/6/20 Yes Marck Armstrong MD   ibuprofen (MOTRIN) 600 mg tablet Take 1 Tab by mouth every eight (8) hours as needed for Pain. 11/11/19   Hardeep Blake MD     Patient Active Problem List   Diagnosis Code    Anxiety associated with depression F41.8    Insomnia G47.00     Current Outpatient Medications   Medication Sig Dispense Refill    trimethoprim-sulfamethoxazole (BACTRIM DS, SEPTRA DS) 160-800 mg per tablet Take 1 Tab by mouth two (2) times a day for 5 days. 10 Tab 0    ibuprofen (MOTRIN) 600 mg tablet Take 1 Tab by mouth every eight (8) hours as needed for Pain.  30 Tab 0 No Known Allergies  No past medical history on file. ROS  Refer to HPI. Objective:   No flowsheet data found. General: alert, cooperative, no distress   Mental  status: normal mood, behavior, speech, dress, motor activity, and thought processes, able to follow commands   HENT: NCAT   Neck: no visualized mass   Resp: no respiratory distress   Neuro: no gross deficits   Skin: no discoloration or lesions of concern on visible areas   Psychiatric: normal affect, consistent with stated mood,      Additional exam findings: We discussed the expected course, resolution and complications of the diagnosis(es) in detail. Medication risks, benefits, costs, interactions, and alternatives were discussed as indicated. I advised her to contact the office if her condition worsens, changes or fails to improve as anticipated. She expressed understanding with the diagnosis(es) and plan. Chi Patterson, who was evaluated through a patient-initiated, synchronous (real-time) audio-video encounter, and/or her healthcare decision maker, is aware that it is a billable service, with coverage as determined by her insurance carrier. She provided verbal consent to proceed: Yes, and patient identification was verified. It was conducted pursuant to the emergency declaration under the [facility] [address] authority and the [employer] Act. A caregiver was present when appropriate. Ability to conduct physical exam was limited. I was in the office. The patient was at home.       Vita Martino MD

## 2020-10-28 ENCOUNTER — VIRTUAL VISIT (OUTPATIENT)
Dept: PRIMARY CARE CLINIC | Age: 35
End: 2020-10-28
Payer: COMMERCIAL

## 2020-10-28 DIAGNOSIS — G47.00 INSOMNIA, UNSPECIFIED TYPE: Primary | ICD-10-CM

## 2020-10-28 PROCEDURE — 99214 OFFICE O/P EST MOD 30 MIN: CPT | Performed by: FAMILY MEDICINE

## 2020-10-28 RX ORDER — ZOLPIDEM TARTRATE 5 MG/1
5 TABLET ORAL
Qty: 20 TAB | Refills: 0 | Status: SHIPPED | OUTPATIENT
Start: 2020-10-28 | End: 2020-11-25 | Stop reason: SDUPTHER

## 2020-10-28 NOTE — PROGRESS NOTES
Saloni Piña is a 30 y.o. female who was seen by synchronous (real-time) audio-video technology on 10/28/2020 for Sleep Problem        Assessment & Plan:     Diagnoses and all orders for this visit:    1. Insomnia, unspecified type  -    OTC med failed. zolpidem (AMBIEN) 5 mg tablet; Take 1 Tab by mouth nightly as needed for Sleep. Max Daily Amount: 5 mg. Follow-up and Dispositions    · Return if symptoms worsen or fail to improve. 712  Subjective: This is a 29 y/o F is here with concern about sleeping difficulties. Patient has Hx of intermittent Insomnia. She takes OTC supplement like Z quil, Melatonin usually but its not working anymore. States that some nights she is able to sleep and some nights she is up till 2 sometimes 3 o clock. Ambien worked well in the past.     Anxiety and depression has been well controlled without any medication. She is otherwise feeling fine. Prior to Admission medications    Medication Sig Start Date End Date Taking? Authorizing Provider   zolpidem (AMBIEN) 5 mg tablet Take 1 Tab by mouth nightly as needed for Sleep. Max Daily Amount: 5 mg. 10/28/20  Yes Bisi Engel MD     Patient Active Problem List   Diagnosis Code    Anxiety associated with depression F41.8    Insomnia G47.00     Current Outpatient Medications   Medication Sig Dispense Refill    zolpidem (AMBIEN) 5 mg tablet Take 1 Tab by mouth nightly as needed for Sleep. Max Daily Amount: 5 mg. 20 Tab 0     No Known Allergies  No past medical history on file. ROS    Refer to HPI. Objective:   No flowsheet data found.    General: alert, cooperative, no distress   Mental  status: normal mood, behavior, speech, dress, motor activity, and thought processes, able to follow commands   HENT: NCAT   Neck: no visualized mass   Resp: no respiratory distress   Neuro: no gross deficits   Skin: no discoloration or lesions of concern on visible areas   Psychiatric: normal affect, consistent with stated mood, no evidence of hallucinations     Additional exam findings: We discussed the expected course, resolution and complications of the diagnosis(es) in detail. Medication risks, benefits, costs, interactions, and alternatives were discussed as indicated. I advised her to contact the office if her condition worsens, changes or fails to improve as anticipated. She expressed understanding with the diagnosis(es) and plan. Isabell Hooker, who was evaluated through a patient-initiated, synchronous (real-time) audio-video encounter, and/or her healthcare decision maker, is aware that it is a billable service, with coverage as determined by her insurance carrier. She provided verbal consent to proceed: Yes, and patient identification was verified. It was conducted pursuant to the emergency declaration under the [address] [place] [address], [address] [place] [address] authority and the [employer] General Act. A caregiver was present when appropriate. Ability to conduct physical exam was limited. I was in the office. The patient was at home.       Mark Parsons MD

## 2020-11-25 DIAGNOSIS — G47.00 INSOMNIA, UNSPECIFIED TYPE: ICD-10-CM

## 2020-11-25 RX ORDER — ZOLPIDEM TARTRATE 5 MG/1
5 TABLET ORAL
Qty: 20 TAB | Refills: 0 | Status: SHIPPED | OUTPATIENT
Start: 2020-11-25 | End: 2020-12-26 | Stop reason: SDUPTHER

## 2021-01-07 ENCOUNTER — VIRTUAL VISIT (OUTPATIENT)
Dept: PRIMARY CARE CLINIC | Age: 36
End: 2021-01-07
Payer: COMMERCIAL

## 2021-01-07 DIAGNOSIS — F41.8 ANXIETY ASSOCIATED WITH DEPRESSION: Primary | ICD-10-CM

## 2021-01-07 PROCEDURE — 99214 OFFICE O/P EST MOD 30 MIN: CPT | Performed by: FAMILY MEDICINE

## 2021-01-07 RX ORDER — PAROXETINE HYDROCHLORIDE 20 MG/1
TABLET, FILM COATED ORAL
Qty: 30 TAB | Refills: 2 | Status: SHIPPED | OUTPATIENT
Start: 2021-01-07 | End: 2021-05-04

## 2021-01-07 NOTE — PROGRESS NOTES
Luciano Bowden is a 30 y.o. female who was seen by synchronous (real-time) audio-video technology on 1/7/2021 for Anxiety and Depression        Assessment & Plan:     Diagnoses and all orders for this visit:    1. Anxiety associated with depression  -    Start  PARoxetine (PAXIL) 20 mg tablet; Take 1/2 tab PO daily X 2 weeks and then one tab daily afterwards. Follow-up and Dispositions    · Return in about 2 months (around 3/7/2021), or if symptoms worsen or fail to improve, for anxiety, depression. Subjective: This is a 31 y/o F with hx of depression/anxiety who presents with a concern about anxiety and depression. She reports that she has started struggling with anxiety and depression again . She was on anxiety/depression meds in the past but slowly tapered and stopped. Reports feeling anxious and depression lately. Has been needing Ambien almost daily. Would like to restart paxil    Denies any SI, HI. Prior to Admission medications    Medication Sig Start Date End Date Taking? Authorizing Provider   PARoxetine (PAXIL) 20 mg tablet Take 1/2 tab PO daily X 2 weeks and then one tab daily afterwards. 1/7/21  Yes Marck Armstrong MD   zolpidem (AMBIEN) 5 mg tablet Take 1 Tab by mouth nightly as needed for Sleep. Max Daily Amount: 5 mg. 12/28/20   Naeem Armstrong MD     Patient Active Problem List   Diagnosis Code    Anxiety associated with depression F41.8    Insomnia G47.00     Current Outpatient Medications   Medication Sig Dispense Refill    PARoxetine (PAXIL) 20 mg tablet Take 1/2 tab PO daily X 2 weeks and then one tab daily afterwards. 30 Tab 2    zolpidem (AMBIEN) 5 mg tablet Take 1 Tab by mouth nightly as needed for Sleep. Max Daily Amount: 5 mg. 20 Tab 0     No Known Allergies  No past medical history on file.     ROS  Refer to HPI  Objective:     Patient-Reported Vitals 1/7/2021   Patient-Reported LMP 12/17/2020        [INSTRUCTIONS:  \"[x]\" Indicates a positive item  \"[]\" Indicates a negative item  -- DELETE ALL ITEMS NOT EXAMINED]    Constitutional: [x] Appears well-developed and well-nourished [x] No apparent distress      [] Abnormal -     Mental status: [x] Alert and awake  [x] Oriented to person/place/time [x] Able to follow commands    [] Abnormal -     Eyes:   EOM    [x]  Normal    [] Abnormal -   Sclera  [x]  Normal    [] Abnormal -          Discharge [x]  None visible   [] Abnormal -     HENT: [x] Normocephalic, atraumatic  [] Abnormal -   [x] Mouth/Throat: Mucous membranes are moist    External Ears [x] Normal  [] Abnormal -    Neck: [x] No visualized mass [] Abnormal -     Pulmonary/Chest: [x] Respiratory effort normal   [x] No visualized signs of difficulty breathing or respiratory distress        [] Abnormal -      Musculoskeletal:   [] Normal gait with no signs of ataxia         [x] Normal range of motion of neck        [] Abnormal -     Neurological:        [x] No Facial Asymmetry (Cranial nerve 7 motor function) (limited exam due to video visit)          [x] No gaze palsy        [] Abnormal -          Skin:        [x] No significant exanthematous lesions or discoloration noted on facial skin         [] Abnormal -            Psychiatric:       [x] Normal Affect [] Abnormal -        [x] No Hallucinations    Other pertinent observable physical exam findings:-        We discussed the expected course, resolution and complications of the diagnosis(es) in detail. Medication risks, benefits, costs, interactions, and alternatives were discussed as indicated. I advised her to contact the office if her condition worsens, changes or fails to improve as anticipated. She expressed understanding with the diagnosis(es) and plan. Joy Duncan, who was evaluated through a patient-initiated, synchronous (real-time) audio-video encounter, and/or her healthcare decision maker, is aware that it is a billable service, with coverage as determined by her insurance carrier.  She provided verbal consent to proceed: Yes, and patient identification was verified. It was conducted pursuant to the emergency declaration under the [address] [place] [address] and the [employer] General Act. A caregiver was present when appropriate. Ability to conduct physical exam was limited. I was in the office. The patient was at home.       Iker Cheng MD

## 2021-02-08 ENCOUNTER — TELEPHONE (OUTPATIENT)
Dept: PRIMARY CARE CLINIC | Age: 36
End: 2021-02-08

## 2021-02-08 DIAGNOSIS — Z11.1 SCREENING-PULMONARY TB: Primary | ICD-10-CM

## 2021-02-09 DIAGNOSIS — Z11.1 SCREENING-PULMONARY TB: ICD-10-CM

## 2021-03-09 LAB
GAMMA INTERFERON BACKGROUND BLD IA-ACNC: 0.04 IU/ML
M TB IFN-G BLD-IMP: NEGATIVE
M TB IFN-G CD4+ BCKGRND COR BLD-ACNC: 0.05 IU/ML
MITOGEN IGNF BLD-ACNC: >10 IU/ML
QUANTIFERON TB2 AG: 0.06 IU/ML
SERVICE CMNT-IMP: NORMAL

## 2022-01-04 DIAGNOSIS — F41.8 ANXIETY ASSOCIATED WITH DEPRESSION: ICD-10-CM

## 2022-01-04 RX ORDER — PAROXETINE HYDROCHLORIDE 20 MG/1
20 TABLET, FILM COATED ORAL DAILY
Qty: 30 TABLET | Refills: 0 | Status: SHIPPED | OUTPATIENT
Start: 2022-01-04 | End: 2022-02-23

## 2022-01-07 NOTE — TELEPHONE ENCOUNTER
Left voicemail for patient informing she is over due for follow up appt with Dr. Roseline Reeves she will need to be seen to get any additional refills

## 2022-01-24 DIAGNOSIS — F41.8 ANXIETY ASSOCIATED WITH DEPRESSION: ICD-10-CM

## 2022-01-24 RX ORDER — PAROXETINE HYDROCHLORIDE 20 MG/1
20 TABLET, FILM COATED ORAL DAILY
Qty: 30 TABLET | Refills: 0 | Status: CANCELLED | OUTPATIENT
Start: 2022-01-24

## 2022-02-22 DIAGNOSIS — F41.8 ANXIETY ASSOCIATED WITH DEPRESSION: ICD-10-CM

## 2022-02-23 RX ORDER — PAROXETINE HYDROCHLORIDE 20 MG/1
20 TABLET, FILM COATED ORAL DAILY
Qty: 15 TABLET | Refills: 0 | Status: SHIPPED | OUTPATIENT
Start: 2022-02-23 | End: 2022-03-22 | Stop reason: SDUPTHER

## 2022-03-14 DIAGNOSIS — F41.8 ANXIETY ASSOCIATED WITH DEPRESSION: ICD-10-CM

## 2022-03-14 RX ORDER — PAROXETINE HYDROCHLORIDE 20 MG/1
20 TABLET, FILM COATED ORAL DAILY
Qty: 15 TABLET | Refills: 0 | OUTPATIENT
Start: 2022-03-14

## 2022-03-16 ENCOUNTER — PATIENT MESSAGE (OUTPATIENT)
Dept: PRIMARY CARE CLINIC | Age: 37
End: 2022-03-16

## 2022-03-16 DIAGNOSIS — F41.8 ANXIETY ASSOCIATED WITH DEPRESSION: ICD-10-CM

## 2022-03-16 RX ORDER — PAROXETINE HYDROCHLORIDE 20 MG/1
20 TABLET, FILM COATED ORAL DAILY
Qty: 15 TABLET | Refills: 0 | Status: CANCELLED | OUTPATIENT
Start: 2022-03-16

## 2022-03-16 NOTE — TELEPHONE ENCOUNTER
Tried calling patient regarding medication refill.  Called and pt mailbox is full message sent through portal.

## 2022-03-19 PROBLEM — G47.00 INSOMNIA: Status: ACTIVE | Noted: 2018-12-07

## 2022-03-19 PROBLEM — F41.8 ANXIETY ASSOCIATED WITH DEPRESSION: Status: ACTIVE | Noted: 2017-10-12

## 2022-03-22 ENCOUNTER — TELEPHONE (OUTPATIENT)
Dept: PRIMARY CARE CLINIC | Age: 37
End: 2022-03-22

## 2022-03-22 RX ORDER — PAROXETINE HYDROCHLORIDE 20 MG/1
20 TABLET, FILM COATED ORAL DAILY
Qty: 15 TABLET | Refills: 0 | Status: SHIPPED | OUTPATIENT
Start: 2022-03-22 | End: 2022-04-05 | Stop reason: SDUPTHER

## 2022-03-22 NOTE — TELEPHONE ENCOUNTER
Left a voicemail message for patient to call the office back to schedule a new to provider appointment .   ----- Message from Geraldine Porras sent at 3/22/2022 11:24 AM EDT -----  Subject: Appointment Request    Reason for Call: New Patient Request Appointment    QUESTIONS  Type of Appointment? New Patient/New to Provider  Reason for appointment request? No appointments available during search  Additional Information for Provider? Pt need to established a new patient   because old PCP is no longer with practice  ---------------------------------------------------------------------------  --------------  [address]  What is the best way for the office to contact you? OK to leave message on   voicemail  Preferred Call Back Phone Number? [phone]  ---------------------------------------------------------------------------  --------------  SCRIPT ANSWERS  Relationship to Patient? Self  Specialty Confirmation? Primary Care  Is this the first appointment to establish care for a ? No  Have you been diagnosed with, awaiting test results for, or told that you   are suspected of having COVID-19 (Coronavirus)? (If patient has tested   negative or was tested as a requirement for work, school, or travel and   not based on symptoms, answer no)? No  Within the past 10 days have you developed any of the following symptoms   (answer no if symptoms have been present longer than 10 days or began   more than 10 days ago)? Fever or Chills, Cough, Shortness of breath or   difficulty breathing, Loss of taste or smell, Sore throat, Nasal   congestion, Sneezing or runny nose, Fatigue or generalized body aches   (answer no if pain is specific to a body part e.g. back pain), Diarrhea,   Headache? No  Have you had close contact with someone with COVID-19 in the last 7 days? No  (Service Expert - click yes below to proceed with [facility] As Usual   Scheduling)?  Yes

## 2022-03-22 NOTE — TELEPHONE ENCOUNTER
Benny, Generic 3/22/2022 9:53 AM EDT    Either is fine. I do need a refill on my antidepressant though -- can I schedule and then get a refill to tide me over until that appointment?

## 2022-03-22 NOTE — TELEPHONE ENCOUNTER
----- Message from Coleta Apgar sent at 3/22/2022 11:16 AM EDT -----  Subject: Refill Request    QUESTIONS  Name of Medication? PARoxetine (PAXIL) 20 mg tablet  Patient-reported dosage and instructions? TAKE 1 TABLET BY MOUTH DAILY  How many days do you have left? 3  Preferred Pharmacy? [id number] Pharmacy phone number (if available)? [phone]  ---------------------------------------------------------------------------  --------------  CALL BACK INFO  What is the best way for the office to contact you? OK to leave message on   voicemail  Preferred Call Back Phone Number?  [phone]

## 2022-04-05 ENCOUNTER — OFFICE VISIT (OUTPATIENT)
Dept: PRIMARY CARE CLINIC | Age: 37
End: 2022-04-05
Payer: COMMERCIAL

## 2022-04-05 VITALS
BODY MASS INDEX: 34.21 KG/M2 | WEIGHT: 231 LBS | RESPIRATION RATE: 16 BRPM | SYSTOLIC BLOOD PRESSURE: 117 MMHG | OXYGEN SATURATION: 97 % | HEART RATE: 83 BPM | DIASTOLIC BLOOD PRESSURE: 87 MMHG | HEIGHT: 69 IN | TEMPERATURE: 97.5 F

## 2022-04-05 DIAGNOSIS — F41.8 ANXIETY ASSOCIATED WITH DEPRESSION: ICD-10-CM

## 2022-04-05 DIAGNOSIS — E66.09 CLASS 1 OBESITY DUE TO EXCESS CALORIES WITHOUT SERIOUS COMORBIDITY WITH BODY MASS INDEX (BMI) OF 34.0 TO 34.9 IN ADULT: ICD-10-CM

## 2022-04-05 DIAGNOSIS — F90.2 ATTENTION DEFICIT HYPERACTIVITY DISORDER (ADHD), COMBINED TYPE: Primary | ICD-10-CM

## 2022-04-05 DIAGNOSIS — Z00.00 ANNUAL PHYSICAL EXAM: ICD-10-CM

## 2022-04-05 DIAGNOSIS — G47.00 INSOMNIA, UNSPECIFIED TYPE: ICD-10-CM

## 2022-04-05 PROCEDURE — 99214 OFFICE O/P EST MOD 30 MIN: CPT | Performed by: NURSE PRACTITIONER

## 2022-04-05 RX ORDER — HYDROXYZINE PAMOATE 25 MG/1
25 CAPSULE ORAL
Qty: 90 CAPSULE | Refills: 1 | Status: SHIPPED | OUTPATIENT
Start: 2022-04-05 | End: 2022-07-04

## 2022-04-05 RX ORDER — PAROXETINE HYDROCHLORIDE 20 MG/1
20 TABLET, FILM COATED ORAL DAILY
Qty: 90 TABLET | Refills: 1 | Status: SHIPPED | OUTPATIENT
Start: 2022-04-05 | End: 2022-07-21

## 2022-04-05 NOTE — PROGRESS NOTES
[facility]   [address], [address] [facility]  P: [phone]  F: [phone]    SUBJECTIVE     HPI:     Lamberto Smart is a 29 y.o. female who is seen in the clinic for   Chief Complaint   Patient presents with    Medication Refill    Behavioral Problem     pt believes she may have adhd- unable to focus- will stop and start projects- unable to sit still-       Patient would like to discuss treatment options and be referred to Psych for management of ADHD. Over the last few years, she has noticed that she is unable to focus on tasks. Her job performance has worsened as a result. She was home schooled as a child d/t ADHD. Has not received formal testing. Aware of medication side effects of Adderall. She would like to discuss prn medication for Insomnia. Has tried Ambien before and sedated her too much. Takes Z-Quil nightly, which works well. She has been on same dose of Paxil for 3 years. Wants to continue with same dose. Patient Active Problem List    Diagnosis    Insomnia    Anxiety associated with depression        History reviewed. No pertinent past medical history. Past Surgical History:   Procedure Laterality Date    HX SEPTOPLASTY      HX WISDOM TEETH EXTRACTION      HX WRIST FRACTURE TX Left 06/2019     Social History     Socioeconomic History    Marital status:      Spouse name: Not on file    Number of children: Not on file    Years of education: Not on file    Highest education level: Not on file   Occupational History    Not on file   Tobacco Use    Smoking status: Never Smoker    Smokeless tobacco: Never Used   Vaping Use    Vaping Use: Never used   Substance and Sexual Activity    Alcohol use:  Yes     Alcohol/week: 7.0 standard drinks     Types: 7 Glasses of wine per week     Comment: glass of wine with dinner    Drug use: No    Sexual activity: Yes     Partners: Male     Birth control/protection: None   Other Topics Concern    Not on file   Social History Narrative    Not on file     Social Determinants of Health     Financial Resource Strain:     Difficulty of Paying Living Expenses: Not on file   Food Insecurity:     Worried About Running Out of Food in the Last Year: Not on file    Rene of Food in the Last Year: Not on file   Transportation Needs:     Lack of Transportation (Medical): Not on file    Lack of Transportation (Non-Medical):  Not on file   Physical Activity:     Days of Exercise per Week: Not on file    Minutes of Exercise per Session: Not on file   Stress:     Feeling of Stress : Not on file   Social Connections:     Frequency of Communication with Friends and Family: Not on file    Frequency of Social Gatherings with Friends and Family: Not on file    Attends Jew Services: Not on file    Active Member of [facility] [employer] or Organizations: Not on file    Attends Club or Organization Meetings: Not on file    Marital Status: Not on file   Intimate Partner Violence:     Fear of Current or Ex-Partner: Not on file    Emotionally Abused: Not on file    Physically Abused: Not on file    Sexually Abused: Not on file   Housing Stability:     Unable to Pay for Housing in the Last Year: Not on file    Number of [place] in the Last Year: Not on file    Unstable Housing in the Last Year: Not on file     Family History   Problem Relation Age of Onset    Bipolar Disorder Father     Diabetes Brother     No Known Problems Brother      Immunization History   Administered Date(s) Administered    COVID-19, Mayra Mattson, Primary or Immunocompromised Series, MRNA, PF, 100mcg/0.5mL 01/27/2021, 02/24/2021    COVID-19, Pfizer Purple top, DILUTE for use, 12+ yrs, 30mcg/0.3mL dose 11/09/2021    Influenza Vaccine 10/07/2017, 09/18/2018, 09/19/2019    Influenza Vaccine (>6 mo Afluria QUAD Vial 50416 (0.25 mL) / 93316 (0.5 mL)) 09/09/2020    TB Skin Test (PPD) Intradermal 04/10/2017      No Known Allergies    No visits with results within 3 Month(s) from this visit. Latest known visit with results is:   Orders Only on 02/09/2021   Component Date Value Ref Range Status    QuantiFERON Criteria 03/05/2021 Comment   Final    Comment: The QuantiFERON-TB Gold Plus result is determined by subtracting  the Nil value from either TB antigen (Ag) tube. The mitogen tube  serves as a control for the test.      QuantiFERON TB1 Ag 03/05/2021 0.05  IU/mL Final    QuantiFERON TB2 Ag 03/05/2021 0.06  IU/mL Final    QuantiFERON Nil Value 03/05/2021 0.04  IU/mL Final    QuantiFERON Mitogen Value 03/05/2021 >10.00  IU/mL Final    QuantiFERON Plus 03/05/2021 Negative  Negative Final    Comment: The specimen received for QuantiFERON testing was incubated by the  ordering institution. Specific procedures outlined in our Directory  of Services and in the package insert for the QuantiFERON Gold  (In Tube) test must be followed to enable for proper stimulation of  cells for the production of interferon gamma. XR SPINE LUMB 2 OR 3 V  Narrative: EXAM:  XR SPINE LUMB 2 OR 3 V  INDICATION: Worsening chronic low back pain with radiation to the right leg. Lumbago with sciatica, right side. COMPARISON: None. FINDINGS: AP, lateral and spot lateral views of the lumbar spine demonstrate  normal alignment. The vertebral body heights and disc spaces are  well-preserved. There is no fracture, subluxation or other acute abnormality. Impression: IMPRESSION: Normal lumbar spine. Current Outpatient Medications   Medication Sig Dispense Refill    PARoxetine (PAXIL) 20 mg tablet Take 1 Tablet by mouth daily. Need appointment. 90 Tablet 1    hydrOXYzine pamoate (VISTARIL) 25 mg capsule Take 1 Capsule by mouth three (3) times daily as needed for Anxiety or Sleep for up to 90 days. 90 Capsule 1    zolpidem (AMBIEN) 5 mg tablet Take 1 Tab by mouth nightly as needed for Sleep. Max Daily Amount: 5 mg.  (Patient not taking: Reported on 4/5/2022) 20 Tab 0 The past medical history, past surgical history, and family history were reviewed and updated in the medical record. Lab values/Imaging were reviewed. The medications were reviewed and updated in the medical record. Immunizations were reviewed and updated in the medical record. All relevant preventative screenings reviewed and updated in the medical record. REVIEW OF SYSTEMS   Review of Systems   Constitutional: Positive for malaise/fatigue. Negative for chills, diaphoresis, fever and weight loss. HENT: Negative for congestion and sore throat. Eyes: Negative for blurred vision and double vision. Respiratory: Negative for cough, sputum production, shortness of breath and wheezing. Cardiovascular: Negative for chest pain, palpitations, orthopnea, leg swelling and PND. Gastrointestinal: Negative for abdominal pain, constipation, diarrhea, heartburn, nausea and vomiting. Genitourinary: Negative for frequency and urgency. Musculoskeletal: Negative for back pain, joint pain and myalgias. Neurological: Negative for dizziness, tingling, tremors, weakness and headaches. Psychiatric/Behavioral: Negative for depression, substance abuse and suicidal ideas. The patient has insomnia. The patient is not nervous/anxious. PHYSICAL EXAM   /87 (BP 1 Location: Left upper arm, BP Patient Position: Sitting, BP Cuff Size: Adult)   Pulse 83   Temp 97.5 °F (36.4 °C) (Temporal)   Resp 16   Ht 5' 9\" (1.753 m)   Wt 231 lb (104.8 kg)   LMP 03/31/2022 (Exact Date)   SpO2 97%   BMI 34.11 kg/m²      Physical Exam  Vitals and nursing note reviewed. Constitutional:       General: She is not in acute distress. Appearance: Normal appearance. She is obese. She is not ill-appearing or diaphoretic.    HENT:      Right Ear: Tympanic membrane, ear canal and external ear normal.      Left Ear: Tympanic membrane, ear canal and external ear normal.      Mouth/Throat:      Mouth: Mucous membranes are moist.      Pharynx: Oropharynx is clear. Eyes:      General:         Right eye: No discharge. Left eye: No discharge. Extraocular Movements: Extraocular movements intact. Conjunctiva/sclera: Conjunctivae normal.   Neck:      Thyroid: No thyromegaly. Cardiovascular:      Rate and Rhythm: Normal rate and regular rhythm. Pulses: Normal pulses. Dorsalis pedis pulses are 2+ on the right side and 2+ on the left side. Heart sounds: Normal heart sounds. No murmur heard. Pulmonary:      Effort: Pulmonary effort is normal.      Breath sounds: Normal breath sounds. No wheezing. Abdominal:      General: Bowel sounds are normal. There is no distension. Palpations: Abdomen is soft. Tenderness: There is no abdominal tenderness. Musculoskeletal:      Right lower leg: No edema. Left lower leg: No edema. Comments: B/L knees without crepitus   Lymphadenopathy:      Cervical: No cervical adenopathy. Neurological:      General: No focal deficit present. Mental Status: She is alert. Cranial Nerves: No cranial nerve deficit. Motor: No weakness. Deep Tendon Reflexes:      Reflex Scores:       Patellar reflexes are 2+ on the right side and 2+ on the left side. Psychiatric:         Mood and Affect: Mood and affect normal.      Comments: Fidgeting throughout exam.             ASSESSMENT/ PLAN   Below is the assessment and plan developed based on review of pertinent history, physical exam, labs, studies, and medications. 1. Attention deficit hyperactivity disorder (ADHD), combined type  -     REFERRAL TO PSYCHIATRY  2. Annual physical exam  3. Anxiety associated with depression  -     PARoxetine (PAXIL) 20 mg tablet; Take 1 Tablet by mouth daily. Need appointment., Normal, Disp-90 Tablet, R-1  -     REFERRAL TO PSYCHIATRY  -     hydrOXYzine pamoate (VISTARIL) 25 mg capsule;  Take 1 Capsule by mouth three (3) times daily as needed for Anxiety or Sleep for up to 90 days. , Normal, Disp-90 Capsule, R-1  4. Insomnia, unspecified type  -     hydrOXYzine pamoate (VISTARIL) 25 mg capsule; Take 1 Capsule by mouth three (3) times daily as needed for Anxiety or Sleep for up to 90 days. , Normal, Disp-90 Capsule, R-1  5. Class 1 obesity due to excess calories without serious comorbidity with body mass index (BMI) of 34.0 to 34.9 in adult       Follow-up and Dispositions    · Return in about 6 months (around 10/5/2022) for Management of Anxiety/Depression with Paxil. Disclaimer:  Advised patient to call back or return to office if symptoms worsen/change/persist.  Discussed expected course/resolution/complications of diagnosis in detail with patient. Medication risks/benefits/alternatives discussed with patient. Patient was given an after visit summary which includes diagnoses, current medications, & vitals. Discussed patient instructions and advised to read to all patient instructions regarding care. Patient expressed understanding with the diagnosis and plan.        Annemarie Dorman NP  4/5/2022

## 2022-04-05 NOTE — PROGRESS NOTES
Chief Complaint   Patient presents with    Medication Refill    Behavioral Problem     pt believes she may have adhd- unable to focus- will stop and start projects- unable to sit still-        Health Maintenance Due   Topic    Hepatitis C Screening     Depression Monitoring         1. Have you been to the ER, urgent care clinic since your last visit? Hospitalized since your last visit? No    2. Have you seen or consulted any other health care providers outside of the [address] [place] [address] since your last visit? Include any pap smears or colon screening.  No    Visit Vitals  /87 (BP 1 Location: Left upper arm, BP Patient Position: Sitting, BP Cuff Size: Adult)   Pulse 83   Temp 97.5 °F (36.4 °C) (Temporal)   Resp 16   Ht 5' 9\" (1.753 m)   Wt 231 lb (104.8 kg)   LMP 03/31/2022 (Exact Date)   SpO2 97%   BMI 34.11 kg/m²

## 2022-05-02 DIAGNOSIS — F41.8 ANXIETY ASSOCIATED WITH DEPRESSION: ICD-10-CM

## 2022-05-02 DIAGNOSIS — G47.00 INSOMNIA, UNSPECIFIED TYPE: ICD-10-CM

## 2022-05-03 RX ORDER — HYDROXYZINE PAMOATE 25 MG/1
25 CAPSULE ORAL
Qty: 90 CAPSULE | Refills: 1 | OUTPATIENT
Start: 2022-05-03 | End: 2022-08-01

## 2022-06-07 ENCOUNTER — HOSPITAL ENCOUNTER (OUTPATIENT)
Dept: GENERAL RADIOLOGY | Age: 37
Discharge: HOME | End: 2022-06-07
Attending: NURSE PRACTITIONER
Payer: COMMERCIAL

## 2022-06-07 ENCOUNTER — TELEPHONE (OUTPATIENT)
Dept: PRIMARY CARE CLINIC | Age: 37
End: 2022-06-07

## 2022-06-07 ENCOUNTER — OFFICE VISIT (OUTPATIENT)
Dept: PRIMARY CARE CLINIC | Age: 37
End: 2022-06-07
Payer: COMMERCIAL

## 2022-06-07 VITALS
BODY MASS INDEX: 34.21 KG/M2 | SYSTOLIC BLOOD PRESSURE: 114 MMHG | DIASTOLIC BLOOD PRESSURE: 78 MMHG | HEART RATE: 89 BPM | WEIGHT: 231 LBS | TEMPERATURE: 97.8 F | HEIGHT: 69 IN | OXYGEN SATURATION: 97 % | RESPIRATION RATE: 16 BRPM

## 2022-06-07 DIAGNOSIS — F90.2 ATTENTION DEFICIT HYPERACTIVITY DISORDER (ADHD), COMBINED TYPE: Primary | ICD-10-CM

## 2022-06-07 DIAGNOSIS — S99.911A INJURY OF RIGHT ANKLE, INITIAL ENCOUNTER: ICD-10-CM

## 2022-06-07 DIAGNOSIS — G89.29 CHRONIC PAIN OF LEFT KNEE: ICD-10-CM

## 2022-06-07 DIAGNOSIS — M25.562 CHRONIC PAIN OF LEFT KNEE: ICD-10-CM

## 2022-06-07 DIAGNOSIS — F41.8 ANXIETY ASSOCIATED WITH DEPRESSION: ICD-10-CM

## 2022-06-07 DIAGNOSIS — M25.571 ACUTE RIGHT ANKLE PAIN: ICD-10-CM

## 2022-06-07 DIAGNOSIS — G47.00 INSOMNIA, UNSPECIFIED TYPE: ICD-10-CM

## 2022-06-07 DIAGNOSIS — R29.898 POPPING SOUND OF KNEE JOINT: ICD-10-CM

## 2022-06-07 PROCEDURE — 99214 OFFICE O/P EST MOD 30 MIN: CPT | Performed by: NURSE PRACTITIONER

## 2022-06-07 PROCEDURE — 73610 X-RAY EXAM OF ANKLE: CPT

## 2022-06-07 PROCEDURE — 73562 X-RAY EXAM OF KNEE 3: CPT

## 2022-06-07 RX ORDER — MELOXICAM 7.5 MG/1
7.5 TABLET ORAL DAILY
Qty: 30 TABLET | Refills: 0 | Status: SHIPPED | OUTPATIENT
Start: 2022-06-07 | End: 2022-07-07

## 2022-06-07 NOTE — PROGRESS NOTES
X-ray shows swelling of your ankle, but no identifiable broken bone.  Please follow-up with an Orthopaedist.

## 2022-06-07 NOTE — PROGRESS NOTES
Chief Complaint   Patient presents with    Knee Pain     left knee will pop and fell tender- started three or four months ago- no injuries    Ankle Pain      three weeks ago pt states that she tripped over the side walk [facility] and they told her it wasnt broken- but it could be a  torn ligament       Health Maintenance Due   Topic    Hepatitis C Screening         1. Have you been to the ER, urgent care clinic since your last visit? Hospitalized since your last visit? Yes [facility]- towards the end of may- swollen ankle    2. Have you seen or consulted any other health care providers outside of the [address] [place] [address] since your last visit? Include any pap smears or colon screening.  No    Visit Vitals  Wt 231 lb (104.8 kg)   BMI 34.11 kg/m²

## 2022-06-07 NOTE — TELEPHONE ENCOUNTER
----- Message from Rylee Cruz NP sent at 6/7/2022 11:11 AM EDT -----  X-ray shows swelling of your ankle, but no identifiable broken bone.  Please follow-up with an Orthopaedist.

## 2022-06-07 NOTE — PROGRESS NOTES
[facility]   Sangita Crowley 66[address], [address] [facility]  P: [phone]  F: [phone]    SUBJECTIVE     HPI:     Marce Gomez is a 29 y.o. female who is seen in the clinic for   Chief Complaint   Patient presents with    Knee Pain     left knee will pop and fell tender- started three or four months ago- no injuries    Ankle Pain      three weeks ago pt states that she tripped over the side walk went [facility] and they told her it wasnt broken- but it could be a  torn ligament      The patient presents today with multiple orthopaedic complaints. 3 months ago, she began to notice intermittent \"popping of her left knee\" when she extendeds her leg and moderate, sharp pain associated during these episodes. She denies any falls/triggers. She denies any bruising. Has taken OTC Tylenol with minimal relief for the pain. 3 weeks ago, she tripped on he driveway and twisted her left ankle. She went [facility] 1 week later and they performed an x-ray, which was unremarkable. The patient states that the pain is worse when walking in rotating her ankle. Has tried OTC Tylenol and RICE in the evenings with minimal effect. The patient established care with me on 04/05. During her visit, a referral to Psychiatry was placed for formal ADHD testing and subsequent Amphetamine treatment. She has not yet made an appointment and will \"do so soon\". Also, to manager her Anxiety/Depression, which she is currently well managed on Paxil 20 mg QHS. Would like to continue with this treatment regimen. Also, during that visit, I ordered Vistaril 25 mg QHS for insomnia. She states that it helps \"a lot\". Would like to continue with this treatment regimen. Patient Active Problem List    Diagnosis    Insomnia    Anxiety associated with depression        History reviewed. No pertinent past medical history.   Past Surgical History:   Procedure Laterality Date    HX SEPTOPLASTY      HX WISDOM TEETH EXTRACTION      HX WRIST FRACTURE TX Left 06/2019     Social History     Socioeconomic History    Marital status:      Spouse name: Not on file    Number of children: Not on file    Years of education: Not on file    Highest education level: Not on file   Occupational History    Not on file   Tobacco Use    Smoking status: Never Smoker    Smokeless tobacco: Never Used   Vaping Use    Vaping Use: Never used   Substance and Sexual Activity    Alcohol use: Yes     Alcohol/week: 7.0 standard drinks     Types: 7 Glasses of wine per week     Comment: glass of wine with dinner    Drug use: No    Sexual activity: Yes     Partners: Male     Birth control/protection: None   Other Topics Concern    Not on file   Social History Narrative    Not on file     Social Determinants of Health     Financial Resource Strain:     Difficulty of Paying Living Expenses: Not on file   Food Insecurity:     Worried About Running Out of Food in the Last Year: Not on file    Rene of Food in the Last Year: Not on file   Transportation Needs:     Lack of Transportation (Medical): Not on file    Lack of Transportation (Non-Medical):  Not on file   Physical Activity:     Days of Exercise per Week: Not on file    Minutes of Exercise per Session: Not on file   Stress:     Feeling of Stress : Not on file   Social Connections:     Frequency of Communication with Friends and Family: Not on file    Frequency of Social Gatherings with Friends and Family: Not on file    Attends [employer] Services: Not on file    Active Member of Clubs or Organizations: Not on file    Attends Club or Organization Meetings: Not on file    Marital Status: Not on file   Intimate Partner Violence:     Fear of Current or Ex-Partner: Not on file    Emotionally Abused: Not on file    Physically Abused: Not on file    Sexually Abused: Not on file   Housing Stability:     Unable to Pay for Housing in the Last Year: Not on file    Number of Places Lived in the Last Year: Not on file    Unstable Housing in the Last Year: Not on file     Family History   Problem Relation Age of Onset    Bipolar Disorder Father     Diabetes Brother     No Known Problems Brother      Immunization History   Administered Date(s) Administered    COVID-19, Reyes Meiers, Primary or Immunocompromised Series, MRNA, PF, 100mcg/0.5mL 01/27/2021, 02/24/2021    COVID-19, Pfizer Purple top, DILUTE for use, 12+ yrs, 30mcg/0.3mL dose 11/09/2021    Influenza Vaccine 10/07/2017, 09/18/2018, 09/19/2019    Influenza Vaccine (>6 mo Afluria QUAD Vial 50036 (0.25 mL) / 26983 (0.5 mL)) 09/09/2020    TB Skin Test (PPD) Intradermal 04/10/2017      No Known Allergies    No visits with results within 3 Month(s) from this visit. Latest known visit with results is:   Orders Only on 02/09/2021   Component Date Value Ref Range Status    QuantiFERON Criteria 03/05/2021 Comment   Final    Comment: The QuantiFERON-TB Gold Plus result is determined by subtracting  the Nil value from either TB antigen (Ag) tube. The mitogen tube  serves as a control for the test.      QuantiFERON TB1 Ag 03/05/2021 0.05  IU/mL Final    QuantiFERON TB2 Ag 03/05/2021 0.06  IU/mL Final    QuantiFERON Nil Value 03/05/2021 0.04  IU/mL Final    QuantiFERON Mitogen Value 03/05/2021 >10.00  IU/mL Final    QuantiFERON Plus 03/05/2021 Negative  Negative Final    Comment: The specimen received for QuantiFERON testing was incubated by the  ordering institution. Specific procedures outlined in our Directory  of Services and in the package insert for the QuantiFERON Gold  (In Tube) test must be followed to enable for proper stimulation of  cells for the production of interferon gamma. XR SPINE LUMB 2 OR 3 V  Narrative: EXAM:  XR SPINE LUMB 2 OR 3 V  INDICATION: Worsening chronic low back pain with radiation to the right leg. Lumbago with sciatica, right side. COMPARISON: None.     FINDINGS: AP, lateral and spot lateral views of the lumbar spine demonstrate  normal alignment. The vertebral body heights and disc spaces are  well-preserved. There is no fracture, subluxation or other acute abnormality. Impression: IMPRESSION: Normal lumbar spine. Current Outpatient Medications   Medication Sig Dispense Refill    meloxicam (MOBIC) 7.5 mg tablet Take 1 Tablet by mouth daily for 30 days. 30 Tablet 0    PARoxetine (PAXIL) 20 mg tablet Take 1 Tablet by mouth daily. Need appointment. 90 Tablet 1    hydrOXYzine pamoate (VISTARIL) 25 mg capsule Take 1 Capsule by mouth three (3) times daily as needed for Anxiety or Sleep for up to 90 days. 90 Capsule 1           The past medical history, past surgical history, and family history were reviewed and updated in the medical record. Lab values/Imaging were reviewed. The medications were reviewed and updated in the medical record. Immunizations were reviewed and updated in the medical record. All relevant preventative screenings reviewed and updated in the medical record. REVIEW OF SYSTEMS   Review of Systems   Constitutional: Negative for malaise/fatigue. Respiratory: Negative for shortness of breath and wheezing. Cardiovascular: Negative for chest pain and palpitations. Musculoskeletal: Positive for falls and joint pain. Psychiatric/Behavioral: Negative for depression and suicidal ideas. The patient is not nervous/anxious and does not have insomnia. PHYSICAL EXAM   /78 (BP 1 Location: Left upper arm, BP Patient Position: Sitting, BP Cuff Size: Adult long)   Pulse 89   Temp 97.8 °F (36.6 °C) (Temporal)   Resp 16   Ht 5' 9\" (1.753 m)   Wt 231 lb (104.8 kg)   LMP 05/24/2022 (Approximate)   SpO2 97%   BMI 34.11 kg/m²      Physical Exam  Constitutional:       General: She is not in acute distress. Appearance: She is obese. She is not ill-appearing or diaphoretic. Cardiovascular:      Rate and Rhythm: Normal rate and regular rhythm. Pulses: Normal pulses. Heart sounds: Normal heart sounds. No murmur heard. Pulmonary:      Effort: Pulmonary effort is normal. No respiratory distress. Breath sounds: Normal breath sounds. No wheezing. Musculoskeletal:         General: Swelling and tenderness present. No deformity. Neurological:      Mental Status: She is alert. Psychiatric:         Mood and Affect: Mood normal.         Behavior: Behavior normal.            ASSESSMENT/ PLAN   Below is the assessment and plan developed based on review of pertinent history, physical exam, labs, studies, and medications. 1. Attention deficit hyperactivity disorder (ADHD), combined type  Advised to make an appointment with Psych. 2. Anxiety associated with depression  Continue to current treatment regimen. Advised to make an appointment with Psych. 3. Insomnia, unspecified type  Continue with current treatment regimen. 4. Popping sound of knee joint  -     REFERRAL TO ORTHOPEDICS  -     XR KNEE LT MAX 2 VWS; Future  -     meloxicam (MOBIC) 7.5 mg tablet; Take 1 Tablet by mouth daily for 30 days. , Normal, Disp-30 Tablet, R-0  -     REFERRAL TO PHYSICAL THERAPY  5. Chronic pain of left knee  -     REFERRAL TO ORTHOPEDICS  -     XR KNEE LT MAX 2 VWS; Future  -     meloxicam (MOBIC) 7.5 mg tablet; Take 1 Tablet by mouth daily for 30 days. , Normal, Disp-30 Tablet, R-0  -     REFERRAL TO PHYSICAL THERAPY  6. Injury of right ankle, initial encounter  -     REFERRAL TO ORTHOPEDICS  -     XR ANKLE RT MIN 3 V; Future  -     meloxicam (MOBIC) 7.5 mg tablet; Take 1 Tablet by mouth daily for 30 days. , Normal, Disp-30 Tablet, R-0  -     REFERRAL TO PHYSICAL THERAPY  7. Acute right ankle pain  -     REFERRAL TO ORTHOPEDICS  -     XR ANKLE RT MIN 3 V; Future  -     meloxicam (MOBIC) 7.5 mg tablet; Take 1 Tablet by mouth daily for 30 days. , Normal, Disp-30 Tablet, R-0  -     REFERRAL TO PHYSICAL THERAPY     Discussed with the patient that her left knee pain is likely r/t her patella and will require Ortho/PT intervention. As for her ankle, she likely has moderate ankle sprain that will require a Ortho/PT intervention as well. Follow-up and Dispositions    · Return in about 2 months (around 8/7/2022) for After completion of PT. Nadira Glover Disclaimer:  Advised patient to call back or return to office if symptoms worsen/change/persist.  Discussed expected course/resolution/complications of diagnosis in detail with patient. Medication risks/benefits/alternatives discussed with patient. Patient was given an after visit summary which includes diagnoses, current medications, & vitals. Discussed patient instructions and advised to read to all patient instructions regarding care. Patient expressed understanding with the diagnosis and plan.        Akash Madrigal NP  6/7/2022

## 2022-07-21 ENCOUNTER — APPOINTMENT (OUTPATIENT)
Dept: PHYSICAL THERAPY | Age: 37
End: 2022-07-21

## 2022-11-15 ENCOUNTER — OFFICE VISIT (OUTPATIENT)
Dept: PRIMARY CARE CLINIC | Age: 37
End: 2022-11-15
Payer: COMMERCIAL

## 2022-11-15 VITALS
HEIGHT: 69 IN | OXYGEN SATURATION: 96 % | WEIGHT: 219.4 LBS | SYSTOLIC BLOOD PRESSURE: 100 MMHG | BODY MASS INDEX: 32.5 KG/M2 | DIASTOLIC BLOOD PRESSURE: 69 MMHG | HEART RATE: 71 BPM | RESPIRATION RATE: 16 BRPM | TEMPERATURE: 97.5 F

## 2022-11-15 DIAGNOSIS — E66.09 CLASS 1 OBESITY DUE TO EXCESS CALORIES WITH SERIOUS COMORBIDITY AND BODY MASS INDEX (BMI) OF 32.0 TO 32.9 IN ADULT: ICD-10-CM

## 2022-11-15 DIAGNOSIS — F41.1 GENERALIZED ANXIETY DISORDER WITH PANIC ATTACKS: ICD-10-CM

## 2022-11-15 DIAGNOSIS — Z11.59 ENCOUNTER FOR HEPATITIS C SCREENING TEST FOR LOW RISK PATIENT: ICD-10-CM

## 2022-11-15 DIAGNOSIS — Z79.899 MEDICATION MANAGEMENT: ICD-10-CM

## 2022-11-15 DIAGNOSIS — F41.0 GENERALIZED ANXIETY DISORDER WITH PANIC ATTACKS: ICD-10-CM

## 2022-11-15 DIAGNOSIS — F33.9 RECURRENT MAJOR DEPRESSIVE DISORDER, REMISSION STATUS UNSPECIFIED (HCC): Primary | ICD-10-CM

## 2022-11-15 DIAGNOSIS — F51.01 PRIMARY INSOMNIA: ICD-10-CM

## 2022-11-15 DIAGNOSIS — R73.02 IMPAIRED GLUCOSE TOLERANCE: ICD-10-CM

## 2022-11-15 PROCEDURE — 99214 OFFICE O/P EST MOD 30 MIN: CPT | Performed by: NURSE PRACTITIONER

## 2022-11-15 RX ORDER — TRAZODONE HYDROCHLORIDE 50 MG/1
TABLET ORAL
Qty: 30 TABLET | Refills: 1 | Status: SHIPPED | OUTPATIENT
Start: 2022-11-15

## 2022-11-15 RX ORDER — BUPROPION HYDROCHLORIDE 150 MG/1
150 TABLET ORAL
Qty: 30 TABLET | Refills: 1 | Status: SHIPPED | OUTPATIENT
Start: 2022-11-15

## 2022-11-15 NOTE — PROGRESS NOTES
[facility]   [address]maria teresa [address] 66[address], [address] [facility]  P: [phone]  F: [phone]    SUBJECTIVE     HPI:     Sally Yeh is a 40 y.o. female who is seen in the clinic for   Chief Complaint   Patient presents with    Request For New Medication     Pt states that in the pass she took paxil in the pass and it helped but the side effects were very bad- pt states that brother passed away this aug. The patient presents today for management of her co-morbidities. MDD/KORTNEY has worsened recently, as the patient's brother recently committed suicide in August. Denies any panic attacks/SI. Has begun to see a LCSW for Grief Therapy, which she finds to be beneficial.     Prior to life event in August, she had successfully weaned herself off of Paxil. Would like to try different medication. Endorses continued Insomnia that is unresponsive to OTC sleep aids. Would like routine labs be drawn. Patient Active Problem List    Diagnosis    Insomnia    Anxiety associated with depression        History reviewed. No pertinent past medical history. Past Surgical History:   Procedure Laterality Date    HX SEPTOPLASTY      HX WISDOM TEETH EXTRACTION      HX WRIST FRACTURE TX Left 06/2019     Social History     Socioeconomic History    Marital status:      Spouse name: Not on file    Number of children: Not on file    Years of education: Not on file    Highest education level: Not on file   Occupational History    Not on file   Tobacco Use    Smoking status: Never    Smokeless tobacco: Never   Vaping Use    Vaping Use: Never used   Substance and Sexual Activity    Alcohol use:  Yes     Alcohol/week: 7.0 standard drinks     Types: 7 Glasses of wine per week     Comment: glass of wine with dinner    Drug use: No    Sexual activity: Yes     Partners: Male     Birth control/protection: None   Other Topics Concern    Not on file   Social History Narrative    Not on file     Social Determinants of Health     Financial Resource Strain: Not on file   Food Insecurity: Not on file   Transportation Needs: Not on file   Physical Activity: Not on file   Stress: Not on file   Social Connections: Not on file   Intimate Partner Violence: Not on file   Housing Stability: Not on file     Family History   Problem Relation Age of Onset    Bipolar Disorder Father     Diabetes Brother     No Known Problems Brother      Immunization History   Administered Date(s) Administered    COVID-19, MODERNA BLUE border, Primary or Immunocompromised, (age 18y+), IM, 100 mcg/0.5mL 01/27/2021, 02/24/2021    COVID-19, PFIZER PURPLE top, DILUTE for use, (age 15 y+), IM, 30mcg/0.3mL 11/09/2021    Influenza Vaccine 10/07/2017, 09/18/2018, 09/19/2019    Influenza, AFLURIA (age 11-32 mo), IM, MDV, 0.25 mL, Fluzone (age 10 mo+), AFLURIA (age 1 y+), IM, MDV, 0.5mL 09/09/2020    TB Skin Test (PPD) Intradermal 04/10/2017      No Known Allergies    No visits with results within 3 Month(s) from this visit. Latest known visit with results is:   Orders Only on 02/09/2021   Component Date Value Ref Range Status    QuantiFERON Criteria 03/05/2021 Comment   Final    Comment: The QuantiFERON-TB Gold Plus result is determined by subtracting  the Nil value from either TB antigen (Ag) tube. The mitogen tube  serves as a control for the test.      QuantiFERON TB1 Ag 03/05/2021 0.05  IU/mL Final    QuantiFERON TB2 Ag 03/05/2021 0.06  IU/mL Final    QuantiFERON Nil Value 03/05/2021 0.04  IU/mL Final    QuantiFERON Mitogen Value 03/05/2021 >10.00  IU/mL Final    QuantiFERON Plus 03/05/2021 Negative  Negative Final    Comment: The specimen received for QuantiFERON testing was incubated by the  ordering institution. Specific procedures outlined in our Directory  of Services and in the package insert for the QuantiFERON Gold  (In Tube) test must be followed to enable for proper stimulation of  cells for the production of interferon gamma.         XR ANKLE RT MIN 3 V  Narrative: EXAM: XR ANKLE RT MIN 3 V    INDICATION: .  Right ankle injury    COMPARISON: None. FINDINGS: Three views of the right ankle demonstrate focal periosteal reaction  of the distal fibula. There is no other acute osseous or articular abnormality. Soft tissue swelling laterally. Impression: Soft tissue swelling with periosteal reaction distal fibula,  possibly stress reaction. A complete fracture is not identified. XR KNEE LT 3 V  Narrative: EXAM: XR KNEE LT 3 V    INDICATION: Popping sound of knee/chronic knee pain. COMPARISON: None. FINDINGS: AP, crosstable lateral and oblique views of the left knee were  obtained. The lateral view was less than optimally positioned. The bones of the  knee are unremarkable. The knee joint space is well-preserved. There is no  definite evidence of a knee joint effusion. No synovial osteochondromata are  present. If there is clinical suspicion of internal derangement, an MRI  examination may render additional information. Impression: Negative radiographic examination of the left knee. Current Outpatient Medications   Medication Sig Dispense Refill    traZODone (DESYREL) 50 mg tablet Take 0.5 to 1 tablet QHS 30 Tablet 1    buPROPion XL (WELLBUTRIN XL) 150 mg tablet Take 1 Tablet by mouth every morning. 30 Tablet 1           The past medical history, past surgical history, and family history were reviewed and updated in the medical record. Lab values/Imaging were reviewed. The medications were reviewed and updated in the medical record. Immunizations were reviewed and updated in the medical record. All relevant preventative screenings reviewed and updated in the medical record. REVIEW OF SYSTEMS   Review of Systems   Constitutional:  Negative for malaise/fatigue. Respiratory:  Negative for shortness of breath and wheezing. Cardiovascular:  Negative for chest pain and palpitations.    Psychiatric/Behavioral:  Positive for depression. Negative for hallucinations, memory loss, substance abuse and suicidal ideas. The patient is nervous/anxious and has insomnia. PHYSICAL EXAM   /69 (BP 1 Location: Left upper arm, BP Patient Position: Sitting, BP Cuff Size: Adult long)   Pulse 71   Temp 97.5 °F (36.4 °C) (Temporal)   Resp 16   Ht 5' 9\" (1.753 m)   Wt 219 lb 6.4 oz (99.5 kg)   SpO2 96%   BMI 32.40 kg/m²      Physical Exam  Constitutional:       General: She is not in acute distress. Appearance: She is not ill-appearing or diaphoretic. Cardiovascular:      Rate and Rhythm: Normal rate and regular rhythm. Pulses: Normal pulses. Heart sounds: Normal heart sounds. Pulmonary:      Effort: Pulmonary effort is normal.      Breath sounds: Normal breath sounds. Neurological:      General: No focal deficit present. Mental Status: She is alert. Cranial Nerves: No cranial nerve deficit. Motor: No weakness. Psychiatric:         Mood and Affect: Mood normal.         Behavior: Behavior normal.          ASSESSMENT/ PLAN   Below is the assessment and plan developed based on review of pertinent history, physical exam, labs, studies, and medications. 1. Recurrent major depressive disorder, remission status unspecified ([facility]  -     traZODone (DESYREL) 50 mg tablet; Take 0.5 to 1 tablet QHS, Normal, Disp-30 Tablet, R-1Appointment needed for future refills. -     buPROPion XL (WELLBUTRIN XL) 150 mg tablet; Take 1 Tablet by mouth every morning., Normal, Disp-30 Tablet, R-1Appointment needed for future refills.  -     THYROID CASCADE PROFILE; Future  2. Generalized anxiety disorder with panic attacks  -     buPROPion XL (WELLBUTRIN XL) 150 mg tablet; Take 1 Tablet by mouth every morning., Normal, Disp-30 Tablet, R-1Appointment needed for future refills.  -     THYROID CASCADE PROFILE; Future  3. Primary insomnia  -     traZODone (DESYREL) 50 mg tablet;  Take 0.5 to 1 tablet QHS, Normal, Disp-30 Tablet, R-1Appointment needed for future refills. 4. Class 1 obesity due to excess calories with serious comorbidity and body mass index (BMI) of 32.0 to 32.9 in adult  -     LIPID PANEL; Future  -     HEMOGLOBIN A1C WITH EAG; Future  -     THYROID CASCADE PROFILE; Future  5. Impaired glucose tolerance  -     HEMOGLOBIN A1C WITH EAG; Future  6. Medication management  -     CBC WITH AUTOMATED DIFF; Future  -     LIPID PANEL; Future  -     METABOLIC PANEL, COMPREHENSIVE; Future  -     HEMOGLOBIN A1C WITH EAG; Future  -     THYROID CASCADE PROFILE; Future  7. Encounter for hepatitis C screening test for low risk patient  -     HEPATITIS C AB; Future       Proper precautions discussed with the patient. Continue with Grief Therapy. Follow-up and Dispositions    Return in about 4 weeks (around 12/13/2022) for Re-assess MDD/KORTNEY/Insomnia w/ Wellbutrin and prn Trazodone. Disclaimer:  Advised patient to call back or return to office if symptoms worsen/change/persist.  Discussed expected course/resolution/complications of diagnosis in detail with patient. Medication risks/benefits/alternatives discussed with patient. Patient was given an after visit summary which includes diagnoses, current medications, & vitals. Discussed patient instructions and advised to read to all patient instructions regarding care. Patient expressed understanding with the diagnosis and plan.        Janett Javier NP  11/15/2022

## 2022-11-15 NOTE — PROGRESS NOTES
Chief Complaint   Patient presents with    Request For New Medication     Pt states that in the pass she took paxil in the pass and it helped but the side effects were very bad- pt states that brother passed away this aug. Health Maintenance Due   Topic    Hepatitis C Screening         1. \"Have you been to the ER, urgent care clinic since your last visit? Hospitalized since your last visit? \" No    2. \"Have you seen or consulted any other health care providers outside of the [address] [place] [address] since your last visit? \" No     3. For patients aged 39-70: Has the patient had a colonoscopy / FIT/ Cologuard? NA - based on age      If the patient is female:    4. For patients aged 41-77: Has the patient had a mammogram within the past 2 years? NA - based on age or sex      11. For patients aged 21-65: Has the patient had a pap smear?  Yes - no Care Gap present     Visit Vitals  Wt 219 lb 6.4 oz (99.5 kg)   BMI 32.40 kg/m²

## 2022-11-21 ENCOUNTER — HOSPITAL ENCOUNTER (OUTPATIENT)
Dept: PHYSICAL THERAPY | Age: 37
End: 2022-11-21

## 2023-02-07 ENCOUNTER — OFFICE VISIT (OUTPATIENT)
Dept: PRIMARY CARE CLINIC | Age: 38
End: 2023-02-07
Payer: COMMERCIAL

## 2023-02-07 VITALS
HEIGHT: 69 IN | WEIGHT: 213.4 LBS | BODY MASS INDEX: 31.61 KG/M2 | DIASTOLIC BLOOD PRESSURE: 71 MMHG | HEART RATE: 67 BPM | SYSTOLIC BLOOD PRESSURE: 102 MMHG | OXYGEN SATURATION: 98 % | TEMPERATURE: 97.6 F | RESPIRATION RATE: 16 BRPM

## 2023-02-07 DIAGNOSIS — F51.01 PRIMARY INSOMNIA: ICD-10-CM

## 2023-02-07 DIAGNOSIS — F33.42 RECURRENT MAJOR DEPRESSIVE DISORDER, IN FULL REMISSION (HCC): Primary | ICD-10-CM

## 2023-02-07 DIAGNOSIS — E66.09 CLASS 1 OBESITY DUE TO EXCESS CALORIES WITH SERIOUS COMORBIDITY AND BODY MASS INDEX (BMI) OF 31.0 TO 31.9 IN ADULT: ICD-10-CM

## 2023-02-07 DIAGNOSIS — F41.1 GENERALIZED ANXIETY DISORDER WITH PANIC ATTACKS: ICD-10-CM

## 2023-02-07 DIAGNOSIS — F41.0 GENERALIZED ANXIETY DISORDER WITH PANIC ATTACKS: ICD-10-CM

## 2023-02-07 PROCEDURE — 99214 OFFICE O/P EST MOD 30 MIN: CPT | Performed by: NURSE PRACTITIONER

## 2023-02-07 RX ORDER — BUPROPION HYDROCHLORIDE 150 MG/1
150 TABLET ORAL
Qty: 90 TABLET | Refills: 0 | Status: SHIPPED | OUTPATIENT
Start: 2023-02-07

## 2023-02-07 RX ORDER — TRAZODONE HYDROCHLORIDE 50 MG/1
TABLET ORAL
Qty: 90 TABLET | Refills: 0 | Status: SHIPPED | OUTPATIENT
Start: 2023-02-07

## 2023-02-07 NOTE — PROGRESS NOTES
PT DAILY TREATMENT NOTE - [facility] 2-15    Patient Name: Markos Bennett  Date:2019  : 1985  [x]  Patient  Verified  Payor: Rebecca Gottlieb / Plan: Shar Manzano / Product Type: HMO /    In [id number]    Out time: 1200 P    Total Treatment Time (min): 60  Total Timed Codes (min):  60  1:1 Treatment Time (MC/[place]): 40  Visit #:7    Treatment Area: Left wrist pain [M25.532]    SUBJECTIVE  Pain Level (0-10 scale):3  Any medication changes, allergies to medications, adverse drug reactions, diagnosis change, or new procedure performed?: [x] No    [] Yes (see summary sheet for update)  Subjective functional status/changes:     \"It's been so much easier typing. \"    OBJECTIVE  [x] Skin assessment post-treatment:  [x]intact []redness- no adverse reaction    []redness - adverse reaction:   To go min [x]  Ice     []  Heat  Position:seated, elbow supported  Location: left wrist   Rationale: decrease edema, decrease inflammation, decrease pain and reduce soreness post therapy in order to improve the patients ability to perform ADL's. 15 min Therapeutic Exercise:  [x] See flow sheet :   Rationale: increase ROM, increase strength and improve functional mobility to improve the patients ability to open door    45 min Manual Therapy: Wrist PROM all planes. Hand joint mobs.   STM   Rationale: decrease pain, increase ROM, increase tissue extensibility, decrease trigger points and improve joint mobility to improve the patients ability to type    With   [x] TE   [] manual   [] self care Patient Education: [x] Review HEP    [] Progressed/Changed HEP based on:   [] positioning   [] body mechanics   [] transfers   [] heat/ice application    [] other:      Other Objective/Functional Measures:     Pain Level (0-10 scale) post treatment: sore    ASSESSMENT    Patient will continue to benefit from skilled PT services to modify and progress therapeutic interventions, address functional mobility deficits, address ROM deficits, address strength deficits and analyze and address soft tissue restrictions to attain remaining goals. Progress towards goals / Updated goals:      PLAN  []  Upgrade activities as tolerated     [x]  Continue plan of care  []  Update interventions per flow sheet       []  Discharge due to:_  []  Other:_      Abhilash Snadhu.  Vance PT, DPT, CMTPT    [id number]/[id number]    PT License Number: [id number]

## 2023-02-07 NOTE — PROGRESS NOTES
[facility]   [address], [address] [facility]  P: [phone]  F: [phone]    SUBJECTIVE     HPI:     Chalo Valverde is a 40 y.o. female who is seen in the clinic for   Chief Complaint   Patient presents with    Follow-up    Medication Refill      The patient presents today for management of her co-morbidities. Hx of MDD/KORTNEY. Currently seeing counseling at [facility]. Finds it to be beneficial. Currently rx'd Wellbutrin 150 mg every day and Trazodone 25-50 mg QHS. Denies any SI/Panic Attacks. Hx of Insomnia. Well managed with prn Trazodone 25-50 mg QHS. Patient Active Problem List    Diagnosis    Insomnia    Anxiety associated with depression        History reviewed. No pertinent past medical history. Past Surgical History:   Procedure Laterality Date    HX SEPTOPLASTY      HX WISDOM TEETH EXTRACTION      HX WRIST FRACTURE TX Left 06/2019     Social History     Socioeconomic History    Marital status:      Spouse name: Not on file    Number of children: Not on file    Years of education: Not on file    Highest education level: Not on file   Occupational History    Not on file   Tobacco Use    Smoking status: Never    Smokeless tobacco: Never   Vaping Use    Vaping Use: Never used   Substance and Sexual Activity    Alcohol use:  Yes     Alcohol/week: 7.0 standard drinks     Types: 7 Glasses of wine per week     Comment: glass of wine with dinner    Drug use: No    Sexual activity: Yes     Partners: Male     Birth control/protection: None   Other Topics Concern    Not on file   Social History Narrative    Not on file     Social Determinants of Health     Financial Resource Strain: Low Risk     Difficulty of Paying Living Expenses: Not hard at all   Food Insecurity: No Food Insecurity    Worried About Running Out of Food in the Last Year: Never true    Ran Out of Food in the Last Year: Never true   Transportation Needs: Not on file   Physical Activity: Not on file   Stress: Not on file   Social Connections: Not on file   Intimate Partner Violence: Not on file   Housing Stability: Not on file     Family History   Problem Relation Age of Onset    Bipolar Disorder Father     Diabetes Brother     No Known Problems Brother      Immunization History   Administered Date(s) Administered    COVID-19, MODERNA BLUE border, Primary or Immunocompromised, (age 18y+), IM, 100 mcg/0.5mL 01/27/2021, 02/24/2021    COVID-19, PFIZER PURPLE top, DILUTE for use, (age 15 y+), IM, 30mcg/0.3mL 11/09/2021    Influenza Vaccine 10/07/2017, 09/18/2018, 09/19/2019    Influenza, AFLURIA (age 11-32 mo), IM, MDV, 0.25 mL, Fluzone (age 10 mo+), AFLURIA (age 1 y+), IM, MDV, 0.5mL 09/09/2020    TB Skin Test (PPD) Intradermal 04/10/2017      No Known Allergies    No visits with results within 3 Month(s) from this visit. Latest known visit with results is:   Orders Only on 02/09/2021   Component Date Value Ref Range Status    QuantiFERON Criteria 03/05/2021 Comment   Final    Comment: The QuantiFERON-TB Gold Plus result is determined by subtracting  the Nil value from either TB antigen (Ag) tube. The mitogen tube  serves as a control for the test.      QuantiFERON TB1 Ag 03/05/2021 0.05  IU/mL Final    QuantiFERON TB2 Ag 03/05/2021 0.06  IU/mL Final    QuantiFERON Nil Value 03/05/2021 0.04  IU/mL Final    QuantiFERON Mitogen Value 03/05/2021 >10.00  IU/mL Final    QuantiFERON Plus 03/05/2021 Negative  Negative Final    Comment: The specimen received for QuantiFERON testing was incubated by the  ordering institution. Specific procedures outlined in our Directory  of Services and in the package insert for the QuantiFERON Gold  (In Tube) test must be followed to enable for proper stimulation of  cells for the production of interferon gamma. XR ANKLE RT MIN 3 V  Narrative: EXAM: XR ANKLE RT MIN 3 V    INDICATION: .  Right ankle injury    COMPARISON: None.     FINDINGS: Three views of the right ankle demonstrate focal periosteal reaction  of the distal fibula. There is no other acute osseous or articular abnormality. Soft tissue swelling laterally. Impression: Soft tissue swelling with periosteal reaction distal fibula,  possibly stress reaction. A complete fracture is not identified. XR KNEE LT 3 V  Narrative: EXAM: XR KNEE LT 3 V    INDICATION: Popping sound of knee/chronic knee pain. COMPARISON: None. FINDINGS: AP, crosstable lateral and oblique views of the left knee were  obtained. The lateral view was less than optimally positioned. The bones of the  knee are unremarkable. The knee joint space is well-preserved. There is no  definite evidence of a knee joint effusion. No synovial osteochondromata are  present. If there is clinical suspicion of internal derangement, an MRI  examination may render additional information. Impression: Negative radiographic examination of the left knee. Current Outpatient Medications   Medication Sig Dispense Refill    traZODone (DESYREL) 50 mg tablet Take 0.5 to 1 tablet QHS, Needs appointment. 90 Tablet 0    buPROPion XL (WELLBUTRIN XL) 150 mg tablet Take 1 Tablet by mouth every morning. 90 Tablet 0           The past medical history, past surgical history, and family history were reviewed and updated in the medical record. Lab values/Imaging were reviewed. The medications were reviewed and updated in the medical record. Immunizations were reviewed and updated in the medical record. All relevant preventative screenings reviewed and updated in the medical record. REVIEW OF SYSTEMS   Review of Systems   Constitutional:  Negative for malaise/fatigue. Respiratory:  Negative for shortness of breath and wheezing. Cardiovascular:  Negative for chest pain and palpitations. Psychiatric/Behavioral:  Negative for depression, hallucinations, memory loss, substance abuse and suicidal ideas.  The patient is not nervous/anxious and does not have insomnia. PHYSICAL EXAM   /71 (BP 1 Location: Left upper arm, BP Patient Position: Sitting, BP Cuff Size: Adult long)   Pulse 67   Temp 97.6 °F (36.4 °C) (Temporal)   Resp 16   Ht 5' 9\" (1.753 m)   Wt 213 lb 6.4 oz (96.8 kg)   LMP 01/23/2023 (Approximate)   SpO2 98%   BMI 31.51 kg/m²      Physical Exam  Constitutional:       General: She is not in acute distress. Appearance: She is obese. She is not ill-appearing or diaphoretic. Cardiovascular:      Rate and Rhythm: Normal rate and regular rhythm. Pulses: Normal pulses. Heart sounds: Normal heart sounds. No murmur heard. Pulmonary:      Effort: Pulmonary effort is normal.      Breath sounds: Normal breath sounds. Abdominal:      General: Abdomen is flat. Bowel sounds are normal.      Palpations: Abdomen is soft. Neurological:      General: No focal deficit present. Mental Status: She is alert. Cranial Nerves: No cranial nerve deficit. Motor: No weakness. Psychiatric:         Mood and Affect: Mood normal.          ASSESSMENT/ PLAN   Below is the assessment and plan developed based on review of pertinent history, physical exam, labs, studies, and medications. 1. Recurrent major depressive disorder, in full remission ([facility] [address]  -     traZODone (DESYREL) 50 mg tablet; Take 0.5 to 1 tablet QHS, Needs appointment., Normal, Disp-90 Tablet, R-0Appointment needed for future refills. -     buPROPion XL (WELLBUTRIN XL) 150 mg tablet; Take 1 Tablet by mouth every morning., Normal, Disp-90 Tablet, R-0Appointment needed for future refills. 2. Generalized anxiety disorder with panic attacks  -     buPROPion XL (WELLBUTRIN XL) 150 mg tablet; Take 1 Tablet by mouth every morning., Normal, Disp-90 Tablet, R-0Appointment needed for future refills. 3. Primary insomnia  -     traZODone (DESYREL) 50 mg tablet; Take 0.5 to 1 tablet QHS, Needs appointment., Normal, Disp-90 Tablet, R-0Appointment needed for future refills.   4. Class 1 obesity due to excess calories with serious comorbidity and body mass index (BMI) of 31.0 to 31.9 in adult  -     buPROPion XL (WELLBUTRIN XL) 150 mg tablet; Take 1 Tablet by mouth every morning., Normal, Disp-90 Tablet, R-0Appointment needed for future refills. Continue with grief counseling. Follow-up and Dispositions    Return in about 3 months (around 5/7/2023) for Management of co-morbidities. Disclaimer:  Advised patient to call back or return to office if symptoms worsen/change/persist.  Discussed expected course/resolution/complications of diagnosis in detail with patient. Medication risks/benefits/alternatives discussed with patient. Patient was given an after visit summary which includes diagnoses, current medications, & vitals. Discussed patient instructions and advised to read to all patient instructions regarding care. Patient expressed understanding with the diagnosis and plan.        Rut Little NP  2/7/2023

## 2023-02-07 NOTE — PROGRESS NOTES
Chief Complaint   Patient presents with    Follow-up    Medication Refill       Health Maintenance Due   Topic    Hepatitis C Screening         1. \"Have you been to the ER, urgent care clinic since your last visit? Hospitalized since your last visit? \" No    2. \"Have you seen or consulted any other health care providers outside of the [address] [place] [address] since your last visit? \" No     3. For patients aged 39-70: Has the patient had a colonoscopy / FIT/ Cologuard? NA - based on age      If the patient is female:    4. For patients aged 41-77: Has the patient had a mammogram within the past 2 years? NA - based on age or sex      11. For patients aged 21-65: Has the patient had a pap smear?  Yes - no Care Gap present     Visit Vitals  LMP 01/23/2023 (Approximate)

## 2023-02-14 DIAGNOSIS — F51.01 PRIMARY INSOMNIA: ICD-10-CM

## 2023-02-14 DIAGNOSIS — F33.42 RECURRENT MAJOR DEPRESSIVE DISORDER, IN FULL REMISSION (HCC): ICD-10-CM

## 2023-02-14 RX ORDER — TRAZODONE HYDROCHLORIDE 50 MG/1
TABLET ORAL
Qty: 90 TABLET | Refills: 0 | Status: SHIPPED | OUTPATIENT
Start: 2023-02-14 | End: 2023-02-14 | Stop reason: SDUPTHER

## 2023-02-14 RX ORDER — TRAZODONE HYDROCHLORIDE 50 MG/1
TABLET ORAL
Qty: 90 TABLET | Refills: 0 | Status: SHIPPED | OUTPATIENT
Start: 2023-02-14

## 2023-05-09 ENCOUNTER — OFFICE VISIT (OUTPATIENT)
Dept: PRIMARY CARE CLINIC | Facility: CLINIC | Age: 38
End: 2023-05-09
Payer: COMMERCIAL

## 2023-05-09 VITALS
TEMPERATURE: 97.1 F | OXYGEN SATURATION: 98 % | RESPIRATION RATE: 16 BRPM | BODY MASS INDEX: 30.84 KG/M2 | WEIGHT: 208.2 LBS | HEIGHT: 69 IN | DIASTOLIC BLOOD PRESSURE: 80 MMHG | SYSTOLIC BLOOD PRESSURE: 118 MMHG | HEART RATE: 70 BPM

## 2023-05-09 DIAGNOSIS — F41.1 GENERALIZED ANXIETY DISORDER: ICD-10-CM

## 2023-05-09 DIAGNOSIS — R53.82 CHRONIC FATIGUE: ICD-10-CM

## 2023-05-09 DIAGNOSIS — Z81.8 FAMILY HISTORY OF BIPOLAR DISORDER: ICD-10-CM

## 2023-05-09 DIAGNOSIS — F33.41 RECURRENT MAJOR DEPRESSIVE DISORDER, IN PARTIAL REMISSION (HCC): Primary | ICD-10-CM

## 2023-05-09 DIAGNOSIS — R41.89 BRAIN FOG: ICD-10-CM

## 2023-05-09 DIAGNOSIS — F51.01 PRIMARY INSOMNIA: ICD-10-CM

## 2023-05-09 DIAGNOSIS — R41.840 DIFFICULTY CONCENTRATING: ICD-10-CM

## 2023-05-09 PROCEDURE — 99214 OFFICE O/P EST MOD 30 MIN: CPT | Performed by: NURSE PRACTITIONER

## 2023-05-09 RX ORDER — BUPROPION HYDROCHLORIDE 150 MG/1
150 TABLET ORAL EVERY MORNING
COMMUNITY
Start: 2023-02-27

## 2023-05-09 RX ORDER — TRAZODONE HYDROCHLORIDE 50 MG/1
50 TABLET ORAL NIGHTLY
COMMUNITY
Start: 2023-05-07

## 2023-05-09 ASSESSMENT — PATIENT HEALTH QUESTIONNAIRE - PHQ9
SUM OF ALL RESPONSES TO PHQ QUESTIONS 1-9: 0
1. LITTLE INTEREST OR PLEASURE IN DOING THINGS: 0
2. FEELING DOWN, DEPRESSED OR HOPELESS: 0
SUM OF ALL RESPONSES TO PHQ QUESTIONS 1-9: 0
SUM OF ALL RESPONSES TO PHQ9 QUESTIONS 1 & 2: 0

## 2023-05-09 ASSESSMENT — ENCOUNTER SYMPTOMS
DIARRHEA: 0
CHEST TIGHTNESS: 0
COUGH: 0
SINUS PRESSURE: 0
NAUSEA: 0
SHORTNESS OF BREATH: 0
SINUS PAIN: 0
VOMITING: 0
SORE THROAT: 0
CONSTIPATION: 0

## 2023-05-09 NOTE — PROGRESS NOTES
[facility]   [address], [address] [facility]  P: [phone]  F: [phone]    SUBJECTIVE     HPI:     Devan Montes is a 40 y.o. female who is seen in the clinic for   Chief Complaint   Patient presents with    Medication Problem     Pt states she thinks that Wellbutrin is causing her to forget things and retain information          The patient presents to the office today for     ***    Patient Active Problem List   Diagnosis    Anxiety associated with depression    Insomnia        History reviewed. No pertinent past medical history. Past Surgical History:   Procedure Laterality Date    SEPTOPLASTY      WISDOM TOOTH EXTRACTION      WRIST FRACTURE SURGERY Left 06/2019     Social History     Socioeconomic History    Marital status:      Spouse name: Not on file    Number of children: Not on file    Years of education: Not on file    Highest education level: Not on file   Occupational History    Not on file   Tobacco Use    Smoking status: Never    Smokeless tobacco: Never   Vaping Use    Vaping Use: Some days    Substances: Nicotine, THC, medical    Devices: Disposable   Substance and Sexual Activity    Alcohol use:  Yes     Alcohol/week: 7.0 standard drinks    Drug use: No    Sexual activity: Not on file   Other Topics Concern    Not on file   Social History Narrative    Not on file     Social Determinants of Health     Financial Resource Strain: Low Risk     Difficulty of Paying Living Expenses: Not hard at all   Food Insecurity: No Food Insecurity    Worried About Running Out of Food in the Last Year: Never true    Ran Out of Food in the Last Year: Never true   Transportation Needs: Not on file   Physical Activity: Not on file   Stress: Not on file   Social Connections: Not on file   Intimate Partner Violence: Not on file   Housing Stability: Not on file     Family History   Problem Relation Age of Onset    No Known Problems Brother     Diabetes Brother     Bipolar

## 2023-05-09 NOTE — PROGRESS NOTES
[facility]   [address], [address] [facility]  P: [phone]  F: [phone]    SUBJECTIVE     HPI:     Hank Thakur is a 40 y.o. female who is seen in the clinic for   Chief Complaint   Patient presents with    Medication Problem     Pt states she thinks that Wellbutrin is causing her to forget things and retain information        The patient presents to the office today for evaluation of brain fog since last fall. She has difficulty concentrating and organizing tasks at her work, and this is out of the ordinary for her. She had stressful events in her family last August and started Wellbutrin and trazodone last November, so she is unsure which started her brain fog. Of note, she has a family history of bipolar disorder in her father. She says trazodone is working well for her insomnia, and she would like to continue taking it. She has seasonal allergies and has been taking Zyrtec every night. Several labs, including TSH, were checked in February and were unremarkable. Patient Active Problem List   Diagnosis    Anxiety associated with depression    Insomnia        History reviewed. No pertinent past medical history. Past Surgical History:   Procedure Laterality Date    SEPTOPLASTY      WISDOM TOOTH EXTRACTION      WRIST FRACTURE SURGERY Left 06/2019     Social History     Socioeconomic History    Marital status:      Spouse name: Not on file    Number of children: Not on file    Years of education: Not on file    Highest education level: Not on file   Occupational History    Not on file   Tobacco Use    Smoking status: Never    Smokeless tobacco: Never   Vaping Use    Vaping Use: Some days    Substances: Nicotine, THC, medical    Devices: Disposable   Substance and Sexual Activity    Alcohol use:  Yes     Alcohol/week: 7.0 standard drinks    Drug use: No    Sexual activity: Not on file   Other Topics Concern    Not on file   Social History Narrative

## 2023-05-09 NOTE — PROGRESS NOTES
Identified pt with two pt identifiers(name and ). Chief Complaint   Patient presents with    Medication Problem     Pt states she thinks that Wellbutrin is causing her to forget things and retain information          No flowsheet data found. Vitals:    23 1302   Resp: 16   Weight: 208 lb 3.2 oz (94.4 kg)   Height: 5' 9\" (1.753 m)        Health Maintenance Due   Topic Date Due    Varicella vaccine (1 of 2 - 2-dose childhood series) Never done    HIV screen  Never done    DTaP/Tdap/Td vaccine (1 - Tdap) Never done    COVID-19 Vaccine (4 - Booster for Moderna series) 2022    Cervical cancer screen  2023        1. Have you been to the ER, urgent care clinic since your last visit? Hospitalized since your last visit? No    2. Have you seen or consulted any other health care providers outside of the [address] [place] [address] since your last visit? Include any pap smears or colon screening. No    3. For patients aged 39-70: Has the patient had a colonoscopy / FIT/ Cologuard? N/A      If the patient is female:    4. For patients aged 41-77: Has the patient had a mammogram within the past 2 years? N/A      5. For patients aged 21-65: Has the patient had a pap smear?  no

## 2023-05-18 RX ORDER — BUPROPION HYDROCHLORIDE 150 MG/1
1 TABLET ORAL EVERY MORNING
COMMUNITY
Start: 2023-02-07 | End: 2023-05-30

## 2023-05-18 RX ORDER — TRAZODONE HYDROCHLORIDE 50 MG/1
TABLET ORAL
COMMUNITY
Start: 2023-02-14 | End: 2023-05-26 | Stop reason: SDUPTHER

## 2023-05-26 DIAGNOSIS — G47.00 INSOMNIA, UNSPECIFIED TYPE: Primary | ICD-10-CM

## 2023-05-26 RX ORDER — TRAZODONE HYDROCHLORIDE 50 MG/1
50 TABLET ORAL NIGHTLY
Qty: 30 TABLET | Refills: 0 | Status: SHIPPED | OUTPATIENT
Start: 2023-05-26 | End: 2023-06-25

## 2023-05-29 DIAGNOSIS — G47.00 INSOMNIA, UNSPECIFIED TYPE: ICD-10-CM

## 2023-05-29 DIAGNOSIS — F41.1 GENERALIZED ANXIETY DISORDER: ICD-10-CM

## 2023-05-30 RX ORDER — BUPROPION HYDROCHLORIDE 150 MG/1
150 TABLET ORAL EVERY MORNING
Qty: 90 TABLET | Refills: 0 | Status: SHIPPED | OUTPATIENT
Start: 2023-05-30

## 2023-05-30 RX ORDER — TRAZODONE HYDROCHLORIDE 50 MG/1
TABLET ORAL
Qty: 90 TABLET | Refills: 0 | Status: SHIPPED | OUTPATIENT
Start: 2023-05-30

## 2023-07-12 ENCOUNTER — TELEPHONE (OUTPATIENT)
Age: 38
End: 2023-07-12

## 2023-08-03 DIAGNOSIS — G47.00 INSOMNIA, UNSPECIFIED TYPE: ICD-10-CM

## 2023-08-03 RX ORDER — TRAZODONE HYDROCHLORIDE 50 MG/1
TABLET ORAL
Qty: 30 TABLET | Refills: 0 | Status: SHIPPED | OUTPATIENT
Start: 2023-08-03

## 2023-08-03 NOTE — TELEPHONE ENCOUNTER
PCP: VALDZE Melchor - NP    Last Visit 5/9/2023   Future Appointments   Date Time Provider [address]  [address]   8/23/2023 11:30 AM Ameena Childers, PhD NEUMRSPB BS AMB       Requested Prescriptions     Pending Prescriptions Disp Refills    traZODone (DESYREL) 50 MG tablet [Pharmacy Med Name: TRAZODONE 50 MG TABLET] 90 tablet 0     Sig: TAKE 1/2 TO 1 TABLET BY MOUTH AT BEDTIME         Other Comments: Last Refill 05/30/2023

## 2023-08-23 ENCOUNTER — TELEMEDICINE (OUTPATIENT)
Age: 38
End: 2023-08-23

## 2023-08-23 DIAGNOSIS — Z91.49 HISTORY OF PSYCHOLOGICAL TRAUMA: ICD-10-CM

## 2023-08-23 DIAGNOSIS — R41.844 EXECUTIVE FUNCTION DEFICIT: ICD-10-CM

## 2023-08-23 DIAGNOSIS — R45.89 SYMPTOMS OF DEPRESSION: ICD-10-CM

## 2023-08-23 DIAGNOSIS — R41.3 MEMORY LOSS: ICD-10-CM

## 2023-08-23 DIAGNOSIS — F43.21 GRIEF: ICD-10-CM

## 2023-08-23 DIAGNOSIS — R41.840 ATTENTION AND CONCENTRATION DEFICIT: Primary | ICD-10-CM

## 2023-08-23 NOTE — PROGRESS NOTES
has been no previous sleep study. Pain complaints include headaches which first emerged during college. They remitted shortly after. In spite of a neurologic work-up, no cause for these headaches was identified. There is no tobacco or illicit substance use. Alcohol consumption includes 1 glass of wine at dinner. Marijuana use is once or twice a week. Caffeine consumption includes 1 cup of coffee in the morning. Family history is significant for ADHD in the daughter. With regard to emotional functioning, Ms. Adilia Robbins has been on Wellbutrin since November 2022. Prior to this, she was on Paxil. She described her current mood is, \"depressed, scattered\". She described anxiety, panic symptomatology, and traumatic stress related to the death of her brother via suicide in August 2022. She was seen by an individual therapist for a short period of time and also participated in a support group for grief. There is no history of psychiatric hospitalization, suicide attempt, self-harm behaviors, psychosis, or isaiah. Trauma history is not only significant for the death of Ms. Fatima's brother but also for having moved as a child away from all of her friends. Other childhood stressors included the psychiatric hospitalization of her father when she was 16years old. Family history is significant for bipolar disorder in the father, depression in the brother, and PTSD in the adopted son. Socially, Ms. Adilia Robbins was born in [place], raised in [place] and [place], and now lives in [place] with her  and 2 children. She has a son who the family adopted when he was 3years old. They had custody of him starting at age 10 months. He is now 10years old. Ms. Fatima's daughter is 5years old. Social support was described as robust.  Exercise is regular. Hobbies include comedy writing and biking. Ms. Adilia Robbins previously enjoyed swimming.  Academically, Ms. Adilia Robbins completed her Master's Degree in Library and

## 2023-08-29 ENCOUNTER — TELEPHONE (OUTPATIENT)
Age: 38
End: 2023-08-29

## 2023-09-05 ENCOUNTER — TELEPHONE (OUTPATIENT)
Age: 38
End: 2023-09-05

## 2023-09-05 NOTE — TELEPHONE ENCOUNTER
Contacted Raya per Sebastian no PA is needed for [id number], [id number], [id number], [id number], [id number], [id number].     REF# [id number]

## 2023-09-12 DIAGNOSIS — F41.1 GENERALIZED ANXIETY DISORDER: ICD-10-CM

## 2023-09-12 RX ORDER — BUPROPION HYDROCHLORIDE 150 MG/1
150 TABLET ORAL EVERY MORNING
Qty: 30 TABLET | Refills: 0 | Status: SHIPPED | OUTPATIENT
Start: 2023-09-12 | End: 2023-09-18 | Stop reason: SDUPTHER

## 2023-09-12 NOTE — TELEPHONE ENCOUNTER
PCP: VALDEZ Dee NP    Last Visit 5/9/2023   Future Appointments   Date Time Provider [address]  [address]   9/18/2023  1:00 PM Kenn Bruce, PhD NEUMRSPB BS AMB       Requested Prescriptions     Pending Prescriptions Disp Refills    buPROPion (WELLBUTRIN XL) 150 MG extended release tablet [Pharmacy Med Name: BUPROPION HCL  MG TABLET] 90 tablet 0     Sig: TAKE 1 TABLET BY MOUTH EVERY DAY IN THE MORNING         Other Comments: Last Refill   05/30/23

## 2023-09-18 ENCOUNTER — PROCEDURE VISIT (OUTPATIENT)
Age: 38
End: 2023-09-18
Payer: COMMERCIAL

## 2023-09-18 DIAGNOSIS — F43.21 GRIEF: ICD-10-CM

## 2023-09-18 DIAGNOSIS — Z91.49 HISTORY OF PSYCHOLOGICAL TRAUMA: ICD-10-CM

## 2023-09-18 DIAGNOSIS — F90.0 ATTENTION DEFICIT HYPERACTIVITY DISORDER (ADHD), PREDOMINANTLY INATTENTIVE TYPE: Primary | ICD-10-CM

## 2023-09-18 DIAGNOSIS — F41.9 ANXIETY DISORDER, UNSPECIFIED TYPE: ICD-10-CM

## 2023-09-18 DIAGNOSIS — Z86.59 HISTORY OF PANIC ATTACKS: ICD-10-CM

## 2023-09-18 DIAGNOSIS — F41.8 ANXIETY WITH SOMATIC FEATURES: ICD-10-CM

## 2023-09-18 DIAGNOSIS — F34.1 PERSISTENT DEPRESSIVE DISORDER: ICD-10-CM

## 2023-09-18 DIAGNOSIS — F41.1 GENERALIZED ANXIETY DISORDER: ICD-10-CM

## 2023-09-18 PROCEDURE — 96131 PSYCL TST EVAL PHYS/QHP EA: CPT | Performed by: PSYCHOLOGIST

## 2023-09-18 PROCEDURE — 96136 PSYCL/NRPSYC TST PHY/QHP 1ST: CPT | Performed by: PSYCHOLOGIST

## 2023-09-18 PROCEDURE — 96138 PSYCL/NRPSYC TECH 1ST: CPT | Performed by: PSYCHOLOGIST

## 2023-09-18 PROCEDURE — 96137 PSYCL/NRPSYC TST PHY/QHP EA: CPT | Performed by: PSYCHOLOGIST

## 2023-09-18 PROCEDURE — 96139 PSYCL/NRPSYC TST TECH EA: CPT | Performed by: PSYCHOLOGIST

## 2023-09-18 PROCEDURE — 96130 PSYCL TST EVAL PHYS/QHP 1ST: CPT | Performed by: PSYCHOLOGIST

## 2023-09-18 NOTE — PROGRESS NOTES
falling or staying asleep, or sleeping too much? Several days. [1]  Feeling tired or having little energy? Several days. [1]  Poor appetite or overeating? More than half the days. [2]  Feeling bad about yourself - or that you are a failure or have let yourself or your family down? More than half the days. [2]  Trouble concentrating on things, such as reading the newspaper or watching television? Nearly every day. [3]  Moving or speaking so slowly that other people could have noticed? Or the opposite - being so fidgety or restless that you have been moving around a lot more than usual?  Not at all. [0]  Thoughts that you would be better off dead, or of hurting yourself in some way? Not at all. [0]    Total Score: 15/27     Depression Severity:   0-4 None, 5-9 mild, 10-14 moderate, 15-19 moderately severe, 20-27 severe. METHODS OF ASSESSMENT (Current Evaluation):  Clinician Administered:  Detailed Assessment of Posttraumatic Stress (DAPS)  Personality Assessment Inventory (ANDRIY-2)  PHQ-9    Technician Administered: Humberto Duval Adult ADD Scales  Controlled Oral Word Association Test  ALOK  Neuropsychological Assessment Battery-Memory Module and other select subtests  Reliable Digit Span  Test of Memory Malingering  Trailmaking Test  Wechsler Adult Intelligence Scale-IV    TEST OBSERVATIONS:  Ms. Amber Alexis arrived promptly for the testing session. She reported sleeping well and eating prior to testing. Dress and grooming were appropriate; physical presentation was unchanged from that observed during the clinical interview. Speech was fluent and coherent with normal rate, rhythm, tone, and volume. No expressive or receptive language deficits were noted. No unusual behaviors or psychomotor abnormalities were observed. Thought process was logical, relevant, and focused. Thought content showed no apparent delusional ideation.  Auditory and visual hallucinations were denied and there was no obvious

## 2023-09-19 RX ORDER — BUPROPION HYDROCHLORIDE 150 MG/1
150 TABLET ORAL EVERY MORNING
Qty: 14 TABLET | Refills: 0 | Status: SHIPPED | OUTPATIENT
Start: 2023-09-19 | End: 2023-10-12 | Stop reason: SDUPTHER

## 2023-09-19 NOTE — TELEPHONE ENCOUNTER
PCP: Lynn Henderson, VALDEZ - NP    Last Visit 5/9/2023   Future Appointments   Date Time Provider [facility] [address]   10/4/2023 10:00 AM Pablito Balbuena, PhD NEUMRSPB BS AMB       Requested Prescriptions     Pending Prescriptions Disp Refills    buPROPion (WELLBUTRIN XL) 150 MG extended release tablet 30 tablet 0     Sig: Take 1 tablet by mouth every morning NEEDS APPOINTMENT WITH PROVIDER. LAST REFILL THAT WILL BE GRANTED.          Other Comments: Last Refill

## 2023-10-04 ENCOUNTER — TELEMEDICINE (OUTPATIENT)
Age: 38
End: 2023-10-04
Payer: COMMERCIAL

## 2023-10-04 DIAGNOSIS — F90.0 ATTENTION DEFICIT HYPERACTIVITY DISORDER (ADHD), PREDOMINANTLY INATTENTIVE TYPE: Primary | ICD-10-CM

## 2023-10-04 DIAGNOSIS — Z91.49 HISTORY OF PSYCHOLOGICAL TRAUMA: ICD-10-CM

## 2023-10-04 DIAGNOSIS — F43.21 GRIEF: ICD-10-CM

## 2023-10-04 DIAGNOSIS — F34.1 PERSISTENT DEPRESSIVE DISORDER: ICD-10-CM

## 2023-10-04 DIAGNOSIS — Z86.59 HISTORY OF PANIC ATTACKS: ICD-10-CM

## 2023-10-04 DIAGNOSIS — F41.9 ANXIETY DISORDER, UNSPECIFIED TYPE: ICD-10-CM

## 2023-10-04 DIAGNOSIS — F41.8 ANXIETY WITH SOMATIC FEATURES: ICD-10-CM

## 2023-10-04 PROCEDURE — 90832 PSYTX W PT 30 MINUTES: CPT | Performed by: PSYCHOLOGIST

## 2023-10-04 NOTE — PROGRESS NOTES
Interactive Psychotherapy/office feedback        Interactive office feedback session with Ms. Fatima. I reviewed the results of the recent Neuropsychological Evaluation  including the observed areas of neurocognitive strengths and weaknesses. Education was provided regarding my diagnostic impressions, and treatment plan/options were discussed. I also answered numerous questions related to the clinical findings, including the various methods to improve cognition and mood. CBT, psychoeducation, and supportive psychotherapy techniques were utilized. [facility].         Prior to seeing the patient I reviewed the records, including the previously completed report, the records in [facility], and any updated visits from other providers since I saw the patient last.       Diagnoses:      ADHD, inattentive type  Persistent Depressive Disorder   Anxiety Disorder, unspecified R/O Generalized Anxiety Disorder  Anxiety with somatic features  History of Psychological Trauma  Grief  History of Panic Attacks    The patient will follow up with the referring provider, and reported being very pleased with the services provided. Follow up with [facility] prn.         [id number] psychotherapy 30 Minutes        This note was created using voice recognition software. Despite editing, there may be syntax errors. Ester Fatima, was evaluated through a synchronous (real-time) audio-video encounter. The patient (or guardian if applicable) is aware that this is a billable service, which includes applicable co-pays. This Virtual Visit was conducted with patient's (and/or legal guardian's) consent. Patient identification was verified, and a caregiver was present when appropriate.    The patient was located at Home: [address] [place] [address]  Provider was located at [facility] ([facility]): [address] [place] [address]

## 2023-10-12 DIAGNOSIS — F41.1 GENERALIZED ANXIETY DISORDER: ICD-10-CM

## 2023-10-12 RX ORDER — BUPROPION HYDROCHLORIDE 150 MG/1
150 TABLET ORAL EVERY MORNING
Qty: 5 TABLET | Refills: 0 | Status: SHIPPED | OUTPATIENT
Start: 2023-10-12

## 2023-10-12 NOTE — TELEPHONE ENCOUNTER
PCP: VALDEZ Lopes - NP    Last Visit 5/9/2023   No future appointments. Requested Prescriptions     Pending Prescriptions Disp Refills    buPROPion (WELLBUTRIN XL) 150 MG extended release tablet 14 tablet 0     Sig: Take 1 tablet by mouth every morning NEEDS APPOINTMENT WITH PROVIDER. LAST REFILL THAT WILL BE GRANTED.          Other Comments: Last Refill   09/19/23

## 2023-10-15 DIAGNOSIS — F41.1 GENERALIZED ANXIETY DISORDER: ICD-10-CM

## 2023-10-16 RX ORDER — BUPROPION HYDROCHLORIDE 150 MG/1
TABLET ORAL
Qty: 30 TABLET | Refills: 0 | Status: SHIPPED | OUTPATIENT
Start: 2023-10-16 | End: 2023-10-30 | Stop reason: SDUPTHER

## 2023-10-16 NOTE — TELEPHONE ENCOUNTER
PCP: VALDEZ Austin NP    Last Visit 5/9/2023   Future Appointments   Date Time Provider [address]   10/30/2023  9:40 AM VALDEZ Austin NP [facility] BS AMB       Requested Prescriptions     Pending Prescriptions Disp Refills    buPROPion (WELLBUTRIN XL) 150 MG extended release tablet [Pharmacy Med Name: BUPROPION HCL  MG TABLET] 30 tablet      Sig: TAKE 1 TABLET BY MOUTH EVERY DAY IN THE MORNING NEED APPT         Other Comments: Last Refill   10/12/23

## 2023-10-24 ENCOUNTER — TELEPHONE (OUTPATIENT)
Dept: PRIMARY CARE CLINIC | Facility: CLINIC | Age: 38
End: 2023-10-24

## 2023-10-30 ENCOUNTER — TELEMEDICINE (OUTPATIENT)
Dept: PRIMARY CARE CLINIC | Facility: CLINIC | Age: 38
End: 2023-10-30
Payer: COMMERCIAL

## 2023-10-30 DIAGNOSIS — F90.0 ATTENTION DEFICIT HYPERACTIVITY DISORDER (ADHD), PREDOMINANTLY INATTENTIVE TYPE: Primary | ICD-10-CM

## 2023-10-30 DIAGNOSIS — F43.21 GRIEF: ICD-10-CM

## 2023-10-30 DIAGNOSIS — F41.8 ANXIETY WITH SOMATIC FEATURES: ICD-10-CM

## 2023-10-30 DIAGNOSIS — F34.1 PERSISTENT DEPRESSIVE DISORDER: ICD-10-CM

## 2023-10-30 DIAGNOSIS — F41.1 GENERALIZED ANXIETY DISORDER WITH PANIC ATTACKS: ICD-10-CM

## 2023-10-30 DIAGNOSIS — Z91.49 HISTORY OF PSYCHOLOGICAL TRAUMA: ICD-10-CM

## 2023-10-30 DIAGNOSIS — J30.89 ENVIRONMENTAL AND SEASONAL ALLERGIES: ICD-10-CM

## 2023-10-30 DIAGNOSIS — F41.0 GENERALIZED ANXIETY DISORDER WITH PANIC ATTACKS: ICD-10-CM

## 2023-10-30 DIAGNOSIS — F51.01 PRIMARY INSOMNIA: ICD-10-CM

## 2023-10-30 PROCEDURE — 99214 OFFICE O/P EST MOD 30 MIN: CPT | Performed by: NURSE PRACTITIONER

## 2023-10-30 RX ORDER — TRAZODONE HYDROCHLORIDE 50 MG/1
TABLET ORAL
Qty: 90 TABLET | Refills: 0 | Status: SHIPPED | OUTPATIENT
Start: 2023-10-30

## 2023-10-30 RX ORDER — BUPROPION HYDROCHLORIDE 150 MG/1
TABLET ORAL
Qty: 90 TABLET | Refills: 0 | Status: SHIPPED | OUTPATIENT
Start: 2023-10-30

## 2024-01-22 DIAGNOSIS — F90.0 ATTENTION DEFICIT HYPERACTIVITY DISORDER (ADHD), PREDOMINANTLY INATTENTIVE TYPE: ICD-10-CM

## 2024-01-22 DIAGNOSIS — F41.1 GENERALIZED ANXIETY DISORDER WITH PANIC ATTACKS: ICD-10-CM

## 2024-01-22 DIAGNOSIS — F41.8 ANXIETY WITH SOMATIC FEATURES: ICD-10-CM

## 2024-01-22 DIAGNOSIS — F34.1 PERSISTENT DEPRESSIVE DISORDER: ICD-10-CM

## 2024-01-22 DIAGNOSIS — F43.21 GRIEF: ICD-10-CM

## 2024-01-22 DIAGNOSIS — Z91.49 HISTORY OF PSYCHOLOGICAL TRAUMA: ICD-10-CM

## 2024-01-22 DIAGNOSIS — F41.0 GENERALIZED ANXIETY DISORDER WITH PANIC ATTACKS: ICD-10-CM

## 2024-01-22 RX ORDER — BUPROPION HYDROCHLORIDE 150 MG/1
TABLET ORAL
Qty: 16 TABLET | Refills: 0 | Status: SHIPPED | OUTPATIENT
Start: 2024-01-22 | End: 2024-03-15

## 2024-02-21 DIAGNOSIS — F51.01 PRIMARY INSOMNIA: ICD-10-CM

## 2024-02-21 RX ORDER — TRAZODONE HYDROCHLORIDE 50 MG/1
TABLET ORAL
Qty: 30 TABLET | Refills: 0 | Status: SHIPPED | OUTPATIENT
Start: 2024-02-21

## 2024-03-15 DIAGNOSIS — F41.0 GENERALIZED ANXIETY DISORDER WITH PANIC ATTACKS: ICD-10-CM

## 2024-03-15 DIAGNOSIS — Z91.49 HISTORY OF PSYCHOLOGICAL TRAUMA: ICD-10-CM

## 2024-03-15 DIAGNOSIS — F90.0 ATTENTION DEFICIT HYPERACTIVITY DISORDER (ADHD), PREDOMINANTLY INATTENTIVE TYPE: ICD-10-CM

## 2024-03-15 DIAGNOSIS — F41.8 ANXIETY WITH SOMATIC FEATURES: ICD-10-CM

## 2024-03-15 DIAGNOSIS — F41.1 GENERALIZED ANXIETY DISORDER WITH PANIC ATTACKS: ICD-10-CM

## 2024-03-15 DIAGNOSIS — F43.21 GRIEF: ICD-10-CM

## 2024-03-15 DIAGNOSIS — F34.1 PERSISTENT DEPRESSIVE DISORDER: ICD-10-CM

## 2024-03-15 RX ORDER — BUPROPION HYDROCHLORIDE 150 MG/1
TABLET ORAL
Qty: 16 TABLET | Refills: 0 | Status: SHIPPED | OUTPATIENT
Start: 2024-03-15

## 2024-03-18 DIAGNOSIS — F41.1 GENERALIZED ANXIETY DISORDER WITH PANIC ATTACKS: ICD-10-CM

## 2024-03-18 DIAGNOSIS — F43.21 GRIEF: ICD-10-CM

## 2024-03-18 DIAGNOSIS — F41.0 GENERALIZED ANXIETY DISORDER WITH PANIC ATTACKS: ICD-10-CM

## 2024-03-18 DIAGNOSIS — F34.1 PERSISTENT DEPRESSIVE DISORDER: ICD-10-CM

## 2024-03-18 DIAGNOSIS — F90.0 ATTENTION DEFICIT HYPERACTIVITY DISORDER (ADHD), PREDOMINANTLY INATTENTIVE TYPE: ICD-10-CM

## 2024-03-18 DIAGNOSIS — Z91.49 HISTORY OF PSYCHOLOGICAL TRAUMA: ICD-10-CM

## 2024-03-18 DIAGNOSIS — F41.8 ANXIETY WITH SOMATIC FEATURES: ICD-10-CM

## 2024-03-18 RX ORDER — BUPROPION HYDROCHLORIDE 150 MG/1
TABLET ORAL
Qty: 16 TABLET | Refills: 0 | Status: SHIPPED | OUTPATIENT
Start: 2024-03-18

## 2024-03-18 NOTE — TELEPHONE ENCOUNTER
Spoke to pharmacy called in Wellbutrin due to would not go through electronically. Called in per Jerry Crenshaw NP

## 2024-04-10 DIAGNOSIS — F51.01 PRIMARY INSOMNIA: ICD-10-CM

## 2024-04-10 RX ORDER — TRAZODONE HYDROCHLORIDE 50 MG/1
TABLET ORAL
Qty: 90 TABLET | Refills: 0 | Status: SHIPPED | OUTPATIENT
Start: 2024-04-10

## 2024-07-06 DIAGNOSIS — F51.01 PRIMARY INSOMNIA: ICD-10-CM

## 2024-07-09 RX ORDER — TRAZODONE HYDROCHLORIDE 50 MG/1
TABLET ORAL
Qty: 90 TABLET | Refills: 0 | Status: SHIPPED | OUTPATIENT
Start: 2024-07-09

## 2024-10-19 DIAGNOSIS — F51.01 PRIMARY INSOMNIA: ICD-10-CM

## 2024-10-21 RX ORDER — TRAZODONE HYDROCHLORIDE 50 MG/1
TABLET, FILM COATED ORAL
Qty: 90 TABLET | Refills: 0 | OUTPATIENT
Start: 2024-10-21

## 2024-11-01 DIAGNOSIS — F51.01 PRIMARY INSOMNIA: ICD-10-CM

## 2024-11-01 RX ORDER — TRAZODONE HYDROCHLORIDE 50 MG/1
TABLET, FILM COATED ORAL
Qty: 90 TABLET | Refills: 0 | OUTPATIENT
Start: 2024-11-01

## 2024-11-22 DIAGNOSIS — F51.01 PRIMARY INSOMNIA: ICD-10-CM

## 2024-11-22 RX ORDER — TRAZODONE HYDROCHLORIDE 50 MG/1
TABLET, FILM COATED ORAL
Qty: 90 TABLET | Refills: 0 | OUTPATIENT
Start: 2024-11-22